# Patient Record
Sex: FEMALE | Race: WHITE | NOT HISPANIC OR LATINO | Employment: FULL TIME | URBAN - METROPOLITAN AREA
[De-identification: names, ages, dates, MRNs, and addresses within clinical notes are randomized per-mention and may not be internally consistent; named-entity substitution may affect disease eponyms.]

---

## 2017-01-03 ENCOUNTER — HOSPITAL ENCOUNTER (EMERGENCY)
Facility: HOSPITAL | Age: 47
Discharge: HOME | End: 2017-01-04
Attending: EMERGENCY MEDICINE | Admitting: EMERGENCY MEDICINE

## 2017-01-03 DIAGNOSIS — I16.0 HYPERTENSIVE URGENCY: Primary | ICD-10-CM

## 2017-01-03 DIAGNOSIS — F41.9 ANXIETY: ICD-10-CM

## 2017-01-03 LAB
ALBUMIN SERPL-MCNC: 3.8 G/DL (ref 3.2–4.8)
ALBUMIN/GLOB SERPL: 1.6 G/DL (ref 1.5–2.5)
ALP SERPL-CCNC: 50 U/L (ref 25–100)
ALT SERPL W P-5'-P-CCNC: 49 U/L (ref 7–40)
ANION GAP SERPL CALCULATED.3IONS-SCNC: 7 MMOL/L (ref 3–11)
AST SERPL-CCNC: 43 U/L (ref 0–33)
BASOPHILS # BLD AUTO: 0.04 10*3/MM3 (ref 0–0.2)
BASOPHILS NFR BLD AUTO: 0.4 % (ref 0–1)
BILIRUB SERPL-MCNC: 0.3 MG/DL (ref 0.3–1.2)
BUN BLD-MCNC: 9 MG/DL (ref 9–23)
BUN/CREAT SERPL: 15 (ref 7–25)
CALCIUM SPEC-SCNC: 8.4 MG/DL (ref 8.7–10.4)
CHLORIDE SERPL-SCNC: 108 MMOL/L (ref 99–109)
CO2 SERPL-SCNC: 23 MMOL/L (ref 20–31)
CREAT BLD-MCNC: 0.6 MG/DL (ref 0.6–1.3)
DEPRECATED RDW RBC AUTO: 42.7 FL (ref 37–54)
EOSINOPHIL # BLD AUTO: 0.43 10*3/MM3 (ref 0.1–0.3)
EOSINOPHIL NFR BLD AUTO: 4 % (ref 0–3)
ERYTHROCYTE [DISTWIDTH] IN BLOOD BY AUTOMATED COUNT: 13.1 % (ref 11.3–14.5)
GFR SERPL CREATININE-BSD FRML MDRD: 107 ML/MIN/1.73
GLOBULIN UR ELPH-MCNC: 2.4 GM/DL
GLUCOSE BLD-MCNC: 88 MG/DL (ref 70–100)
HCT VFR BLD AUTO: 41.8 % (ref 34.5–44)
HGB BLD-MCNC: 14.3 G/DL (ref 11.5–15.5)
IMM GRANULOCYTES # BLD: 0.02 10*3/MM3 (ref 0–0.03)
IMM GRANULOCYTES NFR BLD: 0.2 % (ref 0–0.6)
LYMPHOCYTES # BLD AUTO: 2.28 10*3/MM3 (ref 0.6–4.8)
LYMPHOCYTES NFR BLD AUTO: 21.1 % (ref 24–44)
MCH RBC QN AUTO: 30.4 PG (ref 27–31)
MCHC RBC AUTO-ENTMCNC: 34.2 G/DL (ref 32–36)
MCV RBC AUTO: 88.7 FL (ref 80–99)
MONOCYTES # BLD AUTO: 0.89 10*3/MM3 (ref 0–1)
MONOCYTES NFR BLD AUTO: 8.2 % (ref 0–12)
NEUTROPHILS # BLD AUTO: 7.17 10*3/MM3 (ref 1.5–8.3)
NEUTROPHILS NFR BLD AUTO: 66.1 % (ref 41–71)
PLATELET # BLD AUTO: 221 10*3/MM3 (ref 150–450)
PMV BLD AUTO: 11.5 FL (ref 6–12)
POTASSIUM BLD-SCNC: 3.6 MMOL/L (ref 3.5–5.5)
PROT SERPL-MCNC: 6.2 G/DL (ref 5.7–8.2)
RBC # BLD AUTO: 4.71 10*6/MM3 (ref 3.89–5.14)
SODIUM BLD-SCNC: 138 MMOL/L (ref 132–146)
TROPONIN I SERPL-MCNC: 0.02 NG/ML (ref 0–0.07)
WBC NRBC COR # BLD: 10.83 10*3/MM3 (ref 3.5–10.8)

## 2017-01-03 PROCEDURE — 84484 ASSAY OF TROPONIN QUANT: CPT

## 2017-01-03 PROCEDURE — 96374 THER/PROPH/DIAG INJ IV PUSH: CPT

## 2017-01-03 PROCEDURE — 25010000002 LORAZEPAM PER 2 MG: Performed by: EMERGENCY MEDICINE

## 2017-01-03 PROCEDURE — 85025 COMPLETE CBC W/AUTO DIFF WBC: CPT | Performed by: EMERGENCY MEDICINE

## 2017-01-03 PROCEDURE — 80053 COMPREHEN METABOLIC PANEL: CPT | Performed by: EMERGENCY MEDICINE

## 2017-01-03 PROCEDURE — 93005 ELECTROCARDIOGRAM TRACING: CPT | Performed by: EMERGENCY MEDICINE

## 2017-01-03 PROCEDURE — 93005 ELECTROCARDIOGRAM TRACING: CPT

## 2017-01-03 PROCEDURE — 99284 EMERGENCY DEPT VISIT MOD MDM: CPT

## 2017-01-03 RX ORDER — LORAZEPAM 2 MG/ML
0.5 INJECTION INTRAMUSCULAR
Status: DISCONTINUED | OUTPATIENT
Start: 2017-01-03 | End: 2017-01-04 | Stop reason: HOSPADM

## 2017-01-03 RX ORDER — GUAIFENESIN 600 MG/1
600 TABLET, EXTENDED RELEASE ORAL 2 TIMES DAILY
COMMUNITY
End: 2020-09-28

## 2017-01-03 RX ORDER — SODIUM CHLORIDE 0.9 % (FLUSH) 0.9 %
10 SYRINGE (ML) INJECTION AS NEEDED
Status: DISCONTINUED | OUTPATIENT
Start: 2017-01-03 | End: 2017-01-04 | Stop reason: HOSPADM

## 2017-01-03 RX ORDER — LORAZEPAM 2 MG/ML
1 INJECTION INTRAMUSCULAR ONCE
Status: DISCONTINUED | OUTPATIENT
Start: 2017-01-03 | End: 2017-01-03

## 2017-01-03 RX ADMIN — LORAZEPAM 0.5 MG: 2 INJECTION INTRAMUSCULAR; INTRAVENOUS at 23:17

## 2017-01-04 VITALS
TEMPERATURE: 98.2 F | OXYGEN SATURATION: 93 % | WEIGHT: 210 LBS | BODY MASS INDEX: 30.06 KG/M2 | DIASTOLIC BLOOD PRESSURE: 101 MMHG | HEART RATE: 75 BPM | HEIGHT: 70 IN | RESPIRATION RATE: 16 BRPM | SYSTOLIC BLOOD PRESSURE: 172 MMHG

## 2017-01-04 LAB
HOLD SPECIMEN: NORMAL
HOLD SPECIMEN: NORMAL
WHOLE BLOOD HOLD SPECIMEN: NORMAL
WHOLE BLOOD HOLD SPECIMEN: NORMAL

## 2017-01-04 PROCEDURE — 96376 TX/PRO/DX INJ SAME DRUG ADON: CPT

## 2017-01-04 PROCEDURE — 25010000002 LORAZEPAM PER 2 MG: Performed by: EMERGENCY MEDICINE

## 2017-01-04 PROCEDURE — 96375 TX/PRO/DX INJ NEW DRUG ADDON: CPT

## 2017-01-04 RX ORDER — HYDROCHLOROTHIAZIDE 25 MG/1
25 TABLET ORAL DAILY
Qty: 1 TABLET | Refills: 0 | Status: SHIPPED | OUTPATIENT
Start: 2017-01-04 | End: 2020-09-28

## 2017-01-04 RX ORDER — ALPRAZOLAM 0.25 MG/1
0.25 TABLET ORAL 3 TIMES DAILY PRN
Qty: 10 TABLET | Refills: 0 | Status: SHIPPED | OUTPATIENT
Start: 2017-01-04 | End: 2020-09-28

## 2017-01-04 RX ORDER — LABETALOL HYDROCHLORIDE 5 MG/ML
10 INJECTION, SOLUTION INTRAVENOUS ONCE
Status: COMPLETED | OUTPATIENT
Start: 2017-01-04 | End: 2017-01-04

## 2017-01-04 RX ORDER — LABETALOL HYDROCHLORIDE 5 MG/ML
10 INJECTION, SOLUTION INTRAVENOUS ONCE
Status: DISCONTINUED | OUTPATIENT
Start: 2017-01-04 | End: 2017-01-04

## 2017-01-04 RX ADMIN — LABETALOL HYDROCHLORIDE 10 MG: 5 INJECTION, SOLUTION INTRAVENOUS at 00:56

## 2017-01-04 RX ADMIN — LORAZEPAM 0.5 MG: 2 INJECTION INTRAMUSCULAR; INTRAVENOUS at 00:16

## 2017-01-04 RX ADMIN — LABETALOL HYDROCHLORIDE 10 MG: 5 INJECTION, SOLUTION INTRAVENOUS at 03:08

## 2017-01-04 NOTE — ED PROVIDER NOTES
Subjective   HPI Comments: 46 y.o. female presents to the ED w/ c/o elevated blood pressure today. Pt has had a sinus infection over the last couple days with nasal congestion and sinus pressure. No fever. She was seen at the [facility] this evening and was told her blood pressure was significantly elevated and advised to present to the ED for further evaluation. Pt does not have a history of HTN and states her blood pressure from her most recent office visit was 120/84. It is 248/125 upon presentation to the ED.    Pt notes that she has been under significant stress this weak 2/2 family medical issues and has not gotten a lot of sleep. She has a FHx significant for HTN, CAD, and CVA.    Patient is a 46 y.o. female presenting with hypertension.   History provided by:  Patient  Hypertension   Severity:  Severe  Onset quality:  Unable to specify  Duration:  1 day  Timing:  Constant  Progression:  Unchanged  Chronicity:  New  Time since last dose of antihypertensive: never.  Notable PTA blood pressures:  248/125  Context: stress    Ineffective treatments:  None tried  Associated symptoms: no blurred vision, no chest pain, no fever, no loss of consciousness, no nausea, no shortness of breath, not vomiting and no weakness        Review of Systems   Constitutional: Negative for fever.   HENT: Positive for congestion and sinus pressure.    Eyes: Negative for blurred vision.   Respiratory: Negative for shortness of breath.    Cardiovascular: Negative for chest pain.   Gastrointestinal: Negative for nausea and vomiting.   Neurological: Negative for loss of consciousness and weakness.   All other systems reviewed and are negative.      History reviewed. No pertinent past medical history.    Allergies   Allergen Reactions   • Amoxicillin    • Codeine    • Erythromycin        Past Surgical History   Procedure Laterality Date   • Mouth surgery         History reviewed. No pertinent family history.    Social History     Social  History   • Marital status: Single     Spouse name: N/A   • Number of children: N/A   • Years of education: N/A     Social History Main Topics   • Smoking status: Light Tobacco Smoker     Types: Cigarettes   • Smokeless tobacco: None   • Alcohol use Yes      Comment: VERY RARE OCCASION   • Drug use: No   • Sexual activity: Defer     Other Topics Concern   • None     Social History Narrative   • None         Objective   Physical Exam   Constitutional: She is oriented to person, place, and time. She appears well-developed and well-nourished. No distress.   HENT:   Head: Normocephalic and atraumatic.   Eyes: Conjunctivae are normal. Pupils are equal, round, and reactive to light.   Neck: Normal range of motion. Neck supple.   Cardiovascular: Normal rate, regular rhythm and normal heart sounds.    Pulmonary/Chest: Effort normal and breath sounds normal. No respiratory distress. She has no wheezes. She has no rales. She exhibits no tenderness.   Abdominal: Soft. There is no tenderness.   Musculoskeletal: Normal range of motion. She exhibits no edema.   Neurological: She is alert and oriented to person, place, and time.   Skin: Skin is warm and dry. She is not diaphoretic.   Psychiatric: She has a normal mood and affect. Her behavior is normal.   Nursing note and vitals reviewed.      Procedures         ED Course  ED Course       Course of Care  Results for CHEMA YO (MRN [id number]) as of 1/8/2017 15:51   Ref. Range 1/3/2017 23:13   Glucose Latest Ref Range: 70 - 100 mg/dL 88   Sodium Latest Ref Range: 132 - 146 mmol/L 138   Potassium Latest Ref Range: 3.5 - 5.5 mmol/L 3.6   CO2 Latest Ref Range: 20.0 - 31.0 mmol/L 23.0   Chloride Latest Ref Range: 99 - 109 mmol/L 108   Anion Gap Latest Ref Range: 3.0 - 11.0 mmol/L 7.0   Creatinine Latest Ref Range: 0.60 - 1.30 mg/dL 0.60   BUN Latest Ref Range: 9 - 23 mg/dL 9   BUN/Creatinine Ratio Latest Ref Range: 7.0 - 25.0  15.0   Calcium Latest Ref Range: 8.7 - 10.4  mg/dL 8.4 (L)   eGFR Non African Amer Latest Ref Range: >60 mL/min/1.73 107   Alkaline Phosphatase Latest Ref Range: 25 - 100 U/L 50   Total Protein Latest Ref Range: 5.7 - 8.2 g/dL 6.2   ALT (SGPT) Latest Ref Range: 7 - 40 U/L 49 (H)   AST (SGOT) Latest Ref Range: 0 - 33 U/L 43 (H)   Total Bilirubin Latest Ref Range: 0.3 - 1.2 mg/dL 0.3   Albumin Latest Ref Range: 3.20 - 4.80 g/dL 3.80   Globulin Latest Units: gm/dL 2.4   A/G Ratio Latest Ref Range: 1.5 - 2.5 g/dL 1.6   WBC Latest Ref Range: 3.50 - 10.80 10*3/mm3 10.83 (H)   RBC Latest Ref Range: 3.89 - 5.14 10*6/mm3 4.71   Hemoglobin Latest Ref Range: 11.5 - 15.5 g/dL 14.3   Hematocrit Latest Ref Range: 34.5 - 44.0 % 41.8   RDW Latest Ref Range: 11.3 - 14.5 % 13.1   MCV Latest Ref Range: 80.0 - 99.0 fL 88.7   MCH Latest Ref Range: 27.0 - 31.0 pg 30.4   MCHC Latest Ref Range: 32.0 - 36.0 g/dL 34.2   MPV Latest Ref Range: 6.0 - 12.0 fL 11.5   Platelets Latest Ref Range: 150 - 450 10*3/mm3 221   RDW-SD Latest Ref Range: 37.0 - 54.0 fl 42.7   Neutrophil % Latest Ref Range: 41.0 - 71.0 % 66.1   Lymphocyte % Latest Ref Range: 24.0 - 44.0 % 21.1 (L)   Monocyte % Latest Ref Range: 0.0 - 12.0 % 8.2   Eosinophil % Latest Ref Range: 0.0 - 3.0 % 4.0 (H)   Basophil % Latest Ref Range: 0.0 - 1.0 % 0.4   Immature Grans % Latest Ref Range: 0.0 - 0.6 % 0.2   Neutrophils, Absolute Latest Ref Range: 1.50 - 8.30 10*3/mm3 7.17   Lymphocytes, Absolute Latest Ref Range: 0.60 - 4.80 10*3/mm3 2.28   Monocytes, Absolute Latest Ref Range: 0.00 - 1.00 10*3/mm3 0.89   Eosinophils, Absolute Latest Ref Range: 0.10 - 0.30 10*3/mm3 0.43 (H)   Basophils, Absolute Latest Ref Range: 0.00 - 0.20 10*3/mm3 0.04   Immature Grans, Absolute Latest Ref Range: 0.00 - 0.03 10*3/mm3 0.02       Lab Results (last 24 hours)     ** No results found for the last 24 hours. **          Note: In addition to lab results from this visit, the labs listed above may include labs taken at another facility or during a  different encounter within the last 24 hours. Please correlate lab times with ED admission and discharge times for further clarification of the services performed during this visit.    No orders to display       Vitals:    01/04/17 0300 01/04/17 0320 01/04/17 0340 01/04/17 0342   BP: (!) 174/104 (!) 177/104 (!) 172/101    BP Location:       Patient Position:       Pulse: 75 77  75   Resp:       Temp:       TempSrc:       SpO2: 92% 96% 91% 93%   Weight:       Height:           Medications   labetalol (NORMODYNE,TRANDATE) injection 10 mg (10 mg Intravenous Given 1/4/17 0056)   labetalol (NORMODYNE,TRANDATE) injection 10 mg (10 mg Intravenous Given 1/4/17 0308)       ECG/EMG Results (last 24 hours)     Procedure Component Value Units Date/Time    ECG 12 Lead [[id number]] Collected:  01/03/17 2206     Updated:  01/03/17 2209          Pt understands the risks of HTN.  She denies symptoms at this time and attributes her current exacerbation to a very stressful week (family member illness).  She will check her BP several times/day at home and return to the ED if concerns arise.  Otherwise she will f/u with PCP for BP management.  Family is also comfortable with the plan.              MDM    Final diagnoses:   Hypertensive urgency   Anxiety       Documentation assistance provided by manuel Escalona.  Information recorded by the manuel was done at my direction and has been verified and validated by me.     Danni Escalona  01/03/17 4146       Tanvir Dumont DO  01/08/17 5749

## 2017-01-04 NOTE — DISCHARGE INSTRUCTIONS
Follow up with one of the [facility] physicians below to setup primary care.    (Dr. Otero, Dr. Araiza, Dr. Canales, and Dr. Byrne.)  [facility], Primary Care, [phone], [address], [place] [address]    [facility], Primary Care, [phone], [address], [address] [place], [address] [place]     ([place]) [facility], Primary Care, [phone], [address] [place], [address] [place]     ([facility]) [facility], Primary Care, [phone], [address] [place], [address]     [place] [address] [facility], Primary Care, [phone], [address], [address] [place], [address]    [place] [address] [facility], Primary Care, [phone], [address]    [place], [address] [facility], Primary Care, [phone], [address], [address] [place], [address] [place]    ([facility], Primary Care, [phone], [address] [place], [address] [facility]) [facility], Primary Care, [phone], [address], [address] [place], [address] [facility], Primary Care, [phone], [address], [address] [place], [address] [address] [facility], Primary Care, [phone], [address], [place], [address]     [facility], Primary Care, [phone], [address] [place], [address]

## 2020-09-26 ENCOUNTER — HOSPITAL ENCOUNTER (EMERGENCY)
Facility: HOSPITAL | Age: 50
Discharge: HOME | End: 2020-09-26
Attending: EMERGENCY MEDICINE | Admitting: EMERGENCY MEDICINE

## 2020-09-26 ENCOUNTER — APPOINTMENT (OUTPATIENT)
Dept: CT IMAGING | Facility: HOSPITAL | Age: 50
End: 2020-09-26

## 2020-09-26 VITALS
WEIGHT: 195 LBS | TEMPERATURE: 98 F | HEIGHT: 70 IN | OXYGEN SATURATION: 96 % | RESPIRATION RATE: 12 BRPM | DIASTOLIC BLOOD PRESSURE: 94 MMHG | SYSTOLIC BLOOD PRESSURE: 148 MMHG | BODY MASS INDEX: 27.92 KG/M2 | HEART RATE: 63 BPM

## 2020-09-26 DIAGNOSIS — D72.829 LEUKOCYTOSIS, UNSPECIFIED TYPE: ICD-10-CM

## 2020-09-26 DIAGNOSIS — R19.00 PELVIC MASS: Primary | ICD-10-CM

## 2020-09-26 LAB
ALBUMIN SERPL-MCNC: 5.1 G/DL (ref 3.5–5.2)
ALBUMIN/GLOB SERPL: 1.6 G/DL
ALP SERPL-CCNC: 101 U/L (ref 39–117)
ALT SERPL W P-5'-P-CCNC: 31 U/L (ref 1–33)
ANION GAP SERPL CALCULATED.3IONS-SCNC: 13 MMOL/L (ref 5–15)
AST SERPL-CCNC: 26 U/L (ref 1–32)
BACTERIA UR QL AUTO: ABNORMAL /HPF
BASOPHILS # BLD AUTO: 0.07 10*3/MM3 (ref 0–0.2)
BASOPHILS NFR BLD AUTO: 0.5 % (ref 0–1.5)
BILIRUB SERPL-MCNC: 0.5 MG/DL (ref 0–1.2)
BILIRUB UR QL STRIP: NEGATIVE
BUN SERPL-MCNC: 22 MG/DL (ref 6–20)
BUN/CREAT SERPL: 25.6 (ref 7–25)
CALCIUM SPEC-SCNC: 10 MG/DL (ref 8.6–10.5)
CHLORIDE SERPL-SCNC: 99 MMOL/L (ref 98–107)
CLARITY UR: ABNORMAL
CO2 SERPL-SCNC: 27 MMOL/L (ref 22–29)
COLOR UR: YELLOW
CREAT SERPL-MCNC: 0.86 MG/DL (ref 0.57–1)
DEPRECATED RDW RBC AUTO: 42.1 FL (ref 37–54)
EOSINOPHIL # BLD AUTO: 0.42 10*3/MM3 (ref 0–0.4)
EOSINOPHIL NFR BLD AUTO: 2.8 % (ref 0.3–6.2)
ERYTHROCYTE [DISTWIDTH] IN BLOOD BY AUTOMATED COUNT: 12.4 % (ref 12.3–15.4)
GFR SERPL CREATININE-BSD FRML MDRD: 70 ML/MIN/1.73
GLOBULIN UR ELPH-MCNC: 3.2 GM/DL
GLUCOSE SERPL-MCNC: 115 MG/DL (ref 65–99)
GLUCOSE UR STRIP-MCNC: NEGATIVE MG/DL
HCT VFR BLD AUTO: 48.2 % (ref 34–46.6)
HGB BLD-MCNC: 15.5 G/DL (ref 12–15.9)
HGB UR QL STRIP.AUTO: ABNORMAL
HOLD SPECIMEN: NORMAL
HOLD SPECIMEN: NORMAL
HYALINE CASTS UR QL AUTO: ABNORMAL /LPF
IMM GRANULOCYTES # BLD AUTO: 0.07 10*3/MM3 (ref 0–0.05)
IMM GRANULOCYTES NFR BLD AUTO: 0.5 % (ref 0–0.5)
KETONES UR QL STRIP: NEGATIVE
LEUKOCYTE ESTERASE UR QL STRIP.AUTO: NEGATIVE
LIPASE SERPL-CCNC: 25 U/L (ref 13–60)
LYMPHOCYTES # BLD AUTO: 2.05 10*3/MM3 (ref 0.7–3.1)
LYMPHOCYTES NFR BLD AUTO: 13.7 % (ref 19.6–45.3)
MCH RBC QN AUTO: 29.7 PG (ref 26.6–33)
MCHC RBC AUTO-ENTMCNC: 32.2 G/DL (ref 31.5–35.7)
MCV RBC AUTO: 92.3 FL (ref 79–97)
MONOCYTES # BLD AUTO: 0.99 10*3/MM3 (ref 0.1–0.9)
MONOCYTES NFR BLD AUTO: 6.6 % (ref 5–12)
NEUTROPHILS NFR BLD AUTO: 11.39 10*3/MM3 (ref 1.7–7)
NEUTROPHILS NFR BLD AUTO: 75.9 % (ref 42.7–76)
NITRITE UR QL STRIP: NEGATIVE
NRBC BLD AUTO-RTO: 0 /100 WBC (ref 0–0.2)
PH UR STRIP.AUTO: 6 [PH] (ref 5–8)
PLATELET # BLD AUTO: 269 10*3/MM3 (ref 140–450)
PMV BLD AUTO: 11.3 FL (ref 6–12)
POTASSIUM SERPL-SCNC: 3.8 MMOL/L (ref 3.5–5.2)
PROT SERPL-MCNC: 8.3 G/DL (ref 6–8.5)
PROT UR QL STRIP: ABNORMAL
RBC # BLD AUTO: 5.22 10*6/MM3 (ref 3.77–5.28)
RBC # UR: ABNORMAL /HPF
REF LAB TEST METHOD: ABNORMAL
SODIUM SERPL-SCNC: 139 MMOL/L (ref 136–145)
SP GR UR STRIP: 1.03 (ref 1–1.03)
SQUAMOUS #/AREA URNS HPF: ABNORMAL /HPF
UROBILINOGEN UR QL STRIP: ABNORMAL
WBC # BLD AUTO: 14.99 10*3/MM3 (ref 3.4–10.8)
WBC UR QL AUTO: ABNORMAL /HPF
WHOLE BLOOD HOLD SPECIMEN: NORMAL
WHOLE BLOOD HOLD SPECIMEN: NORMAL

## 2020-09-26 PROCEDURE — 96375 TX/PRO/DX INJ NEW DRUG ADDON: CPT

## 2020-09-26 PROCEDURE — 85025 COMPLETE CBC W/AUTO DIFF WBC: CPT | Performed by: EMERGENCY MEDICINE

## 2020-09-26 PROCEDURE — 83690 ASSAY OF LIPASE: CPT | Performed by: EMERGENCY MEDICINE

## 2020-09-26 PROCEDURE — 80053 COMPREHEN METABOLIC PANEL: CPT | Performed by: EMERGENCY MEDICINE

## 2020-09-26 PROCEDURE — 99284 EMERGENCY DEPT VISIT MOD MDM: CPT

## 2020-09-26 PROCEDURE — 72194 CT PELVIS W/O & W/DYE: CPT

## 2020-09-26 PROCEDURE — 74176 CT ABD & PELVIS W/O CONTRAST: CPT

## 2020-09-26 PROCEDURE — 25010000002 IOPAMIDOL 61 % SOLUTION: Performed by: EMERGENCY MEDICINE

## 2020-09-26 PROCEDURE — 51702 INSERT TEMP BLADDER CATH: CPT

## 2020-09-26 PROCEDURE — 81001 URINALYSIS AUTO W/SCOPE: CPT | Performed by: EMERGENCY MEDICINE

## 2020-09-26 PROCEDURE — 96374 THER/PROPH/DIAG INJ IV PUSH: CPT

## 2020-09-26 PROCEDURE — 25010000002 KETOROLAC TROMETHAMINE PER 15 MG: Performed by: EMERGENCY MEDICINE

## 2020-09-26 PROCEDURE — 25010000002 ONDANSETRON PER 1 MG: Performed by: EMERGENCY MEDICINE

## 2020-09-26 RX ORDER — KETOROLAC TROMETHAMINE 15 MG/ML
15 INJECTION, SOLUTION INTRAMUSCULAR; INTRAVENOUS ONCE
Status: COMPLETED | OUTPATIENT
Start: 2020-09-26 | End: 2020-09-26

## 2020-09-26 RX ORDER — SODIUM CHLORIDE 0.9 % (FLUSH) 0.9 %
10 SYRINGE (ML) INJECTION AS NEEDED
Status: DISCONTINUED | OUTPATIENT
Start: 2020-09-26 | End: 2020-09-26 | Stop reason: HOSPADM

## 2020-09-26 RX ORDER — ONDANSETRON 2 MG/ML
4 INJECTION INTRAMUSCULAR; INTRAVENOUS ONCE
Status: COMPLETED | OUTPATIENT
Start: 2020-09-26 | End: 2020-09-26

## 2020-09-26 RX ORDER — LOSARTAN POTASSIUM AND HYDROCHLOROTHIAZIDE 12.5; 1 MG/1; MG/1
1 TABLET ORAL DAILY
COMMUNITY

## 2020-09-26 RX ADMIN — ONDANSETRON 4 MG: 2 INJECTION INTRAMUSCULAR; INTRAVENOUS at 11:48

## 2020-09-26 RX ADMIN — KETOROLAC TROMETHAMINE 15 MG: 15 INJECTION, SOLUTION INTRAMUSCULAR; INTRAVENOUS at 11:50

## 2020-09-26 RX ADMIN — IOPAMIDOL 35 ML: 612 INJECTION, SOLUTION INTRAVENOUS at 17:45

## 2020-09-26 RX ADMIN — SODIUM CHLORIDE 1000 ML: 9 INJECTION, SOLUTION INTRAVENOUS at 11:49

## 2020-09-26 NOTE — ED PROVIDER NOTES
Subjective   50-year-old female presents for evaluation of right flank pain.  Of note, the patient has no prior history of kidney stones.  She states that this morning at approximately 7:30 AM she began experiencing significant pain in her right flank that radiated to her right groin.  The pain has been coming in waves since that time.  At its worst, the pain was 9 out of 10 in severity.  She endorses accompanying nausea as well.  She denies any urinary symptoms.  No known triggers for her symptoms.          Review of Systems   Gastrointestinal: Positive for nausea.   Genitourinary: Positive for flank pain.   All other systems reviewed and are negative.      Past Medical History:   Diagnosis Date   • Hypertension        Allergies   Allergen Reactions   • Amoxicillin Other (See Comments)     ABD PAIN   • Codeine Other (See Comments)     CODEINE     • Erythromycin Other (See Comments)     ABD PAIN/SICK FEELING       Past Surgical History:   Procedure Laterality Date   • MOUTH SURGERY         History reviewed. No pertinent family history.    Social History     Socioeconomic History   • Marital status: Single     Spouse name: Not on file   • Number of children: Not on file   • Years of education: Not on file   • Highest education level: Not on file   Tobacco Use   • Smoking status: Light Tobacco Smoker     Packs/day: 0.30     Types: Cigarettes   Substance and Sexual Activity   • Alcohol use: Yes     Alcohol/week: 3.0 standard drinks     Types: 3 Glasses of wine per week   • Drug use: No   • Sexual activity: Defer           Objective   Physical Exam  Vitals signs and nursing note reviewed.   Constitutional:       General: She is not in acute distress.     Appearance: Normal appearance. She is well-developed. She is not diaphoretic.      Comments: Nontoxic-appearing female   HENT:      Head: Normocephalic and atraumatic.   Neck:      Musculoskeletal: Normal range of motion and neck supple.   Cardiovascular:      Rate and  Rhythm: Normal rate.      Heart sounds: Normal heart sounds. No murmur. No friction rub. No gallop.    Pulmonary:      Effort: Pulmonary effort is normal. No respiratory distress.      Breath sounds: Normal breath sounds. No wheezing or rales.   Abdominal:      General: Bowel sounds are normal. There is no distension.      Palpations: Abdomen is soft. There is no mass.      Tenderness: There is no abdominal tenderness. There is no guarding or rebound.      Comments: No focal abdominal tenderness, no peritoneal signs, no pain out of proportion to exam   Genitourinary:     Comments: No CVA tenderness noted  Musculoskeletal: Normal range of motion.   Skin:     General: Skin is warm and dry.      Findings: No erythema or rash.      Comments: No dermatomal rash noted to right flank   Neurological:      Mental Status: She is alert and oriented to person, place, and time.   Psychiatric:         Thought Content: Thought content normal.         Judgment: Judgment normal.         Procedures           ED Course  ED Course as of Sep 26 1902   Sat Sep 26, 2020   1200 50-year-old female presents for evaluation of right flank pain.  She has no prior history of kidney stones.  She began experiencing pain in her right flank accompanied by nausea this morning at 7:30 AM.  Her symptoms have come in waves.  On arrival to the ED, the patient is nontoxic-appearing.  Exam and clinical presentation appears consistent with likely kidney stone.  We will obtain labs and imaging and will reassess following initial interventions.    [DD]   1256 CT revealed markedly distended urinary bladder.  We will place a catheter and will reassess afterward.    [DD]   3602 I was called to the patient's bedside by her nurse as the first time that an in and out catheter was placed, there was only about 200 cc of urine output.  However, the patient's bladder on her bedside bladder scan was still markedly distended with more than 1 L and the patient was still  "quite symptomatic.  I personally went into the patient's room and we once again reattempted to place an in and out catheter.  Approximately 50 cc of urine output was noted and the patient still has marketed urinary retention.    [DD]   1438 Dr. Dean recommended placing an indwelling 18 Cambodian [place] catheter.    [DD]   1548 An 18 Cambodian catheter was placed without difficulty and once again minimal urine output was obtained.  I am quite perplexed at this point as I have watched the nurse place a catheter 3 times now and it is clearly within the urethra; however, the patient's bladder is not draining adequately.    [DD]   1607 I once again discussed the patient's case with Dr. Dean.      [DD]   1609 He recommended obtaining a CT cystogram as he felt that the \"bladder\" that we were seeing on CT scan was most likely not the patient's urinary bladder but was more likely a large pelvic mass.    [DD]   1743 CT cystogram confirmed that the pathology we were seeing on CT imaging was indeed a pelvic mass rather than the patient's urinary bladder.    [DD]   1754 FRANKY query complete. Treatment plan to include limited course of prescribed  controlled substance. Risks including addiction, benefits, and alternatives presented to patient.   Request number: [id number]     [BS]   8976 I discussed the patient's case with Dr. Foster.  She recommended giving the patient the option of inpatient versus outpatient management.  I spoke with the patient who would prefer to go home and follow-up in the office on Monday.  I feel that this is a perfectly reasonable request.  Her pain is adequately controlled at this time.  She is voiding without difficulty.  She will follow-up on Monday morning at 830.  Agreeable with plan and given appropriate strict return precautions.    [DD]      ED Course User Index  [BS] Quintin Celis  [DD] Martínez Winchester MD                                   Recent Results (from the past 24 hour(s)) "   Comprehensive Metabolic Panel    Collection Time: 09/26/20 11:29 AM    Specimen: Blood   Result Value Ref Range    Glucose 115 (H) 65 - 99 mg/dL    BUN 22 (H) 6 - 20 mg/dL    Creatinine 0.86 0.57 - 1.00 mg/dL    Sodium 139 136 - 145 mmol/L    Potassium 3.8 3.5 - 5.2 mmol/L    Chloride 99 98 - 107 mmol/L    CO2 27.0 22.0 - 29.0 mmol/L    Calcium 10.0 8.6 - 10.5 mg/dL    Total Protein 8.3 6.0 - 8.5 g/dL    Albumin 5.10 3.50 - 5.20 g/dL    ALT (SGPT) 31 1 - 33 U/L    AST (SGOT) 26 1 - 32 U/L    Alkaline Phosphatase 101 39 - 117 U/L    Total Bilirubin 0.5 0.0 - 1.2 mg/dL    eGFR Non African Amer 70 >60 mL/min/1.73    Globulin 3.2 gm/dL    A/G Ratio 1.6 g/dL    BUN/Creatinine Ratio 25.6 (H) 7.0 - 25.0    Anion Gap 13.0 5.0 - 15.0 mmol/L   Lipase    Collection Time: 09/26/20 11:29 AM    Specimen: Blood   Result Value Ref Range    Lipase 25 13 - 60 U/L   Urinalysis With Microscopic If Indicated (No Culture) - Urine, Clean Catch    Collection Time: 09/26/20 11:29 AM    Specimen: Urine, Clean Catch   Result Value Ref Range    Color, UA Yellow Yellow, Straw    Appearance, UA Cloudy (A) Clear    pH, UA 6.0 5.0 - 8.0    Specific Gravity, UA 1.029 1.001 - 1.030    Glucose, UA Negative Negative    Ketones, UA Negative Negative    Bilirubin, UA Negative Negative    Blood, UA Moderate (2+) (A) Negative    Protein, UA 30 mg/dL (1+) (A) Negative    Leuk Esterase, UA Negative Negative    Nitrite, UA Negative Negative    Urobilinogen, UA 1.0 E.U./dL 0.2 - 1.0 E.U./dL   Light Blue Top    Collection Time: 09/26/20 11:29 AM   Result Value Ref Range    Extra Tube hold for add-on    Green Top (Gel)    Collection Time: 09/26/20 11:29 AM   Result Value Ref Range    Extra Tube Hold for add-ons.    Lavender Top    Collection Time: 09/26/20 11:29 AM   Result Value Ref Range    Extra Tube hold for add-on    Gold Top - SST    Collection Time: 09/26/20 11:29 AM   Result Value Ref Range    Extra Tube Hold for add-ons.    CBC Auto Differential     Collection Time: 09/26/20 11:29 AM    Specimen: Blood   Result Value Ref Range    WBC 14.99 (H) 3.40 - 10.80 10*3/mm3    RBC 5.22 3.77 - 5.28 10*6/mm3    Hemoglobin 15.5 12.0 - 15.9 g/dL    Hematocrit 48.2 (H) 34.0 - 46.6 %    MCV 92.3 79.0 - 97.0 fL    MCH 29.7 26.6 - 33.0 pg    MCHC 32.2 31.5 - 35.7 g/dL    RDW 12.4 12.3 - 15.4 %    RDW-SD 42.1 37.0 - 54.0 fl    MPV 11.3 6.0 - 12.0 fL    Platelets 269 140 - 450 10*3/mm3    Neutrophil % 75.9 42.7 - 76.0 %    Lymphocyte % 13.7 (L) 19.6 - 45.3 %    Monocyte % 6.6 5.0 - 12.0 %    Eosinophil % 2.8 0.3 - 6.2 %    Basophil % 0.5 0.0 - 1.5 %    Immature Grans % 0.5 0.0 - 0.5 %    Neutrophils, Absolute 11.39 (H) 1.70 - 7.00 10*3/mm3    Lymphocytes, Absolute 2.05 0.70 - 3.10 10*3/mm3    Monocytes, Absolute 0.99 (H) 0.10 - 0.90 10*3/mm3    Eosinophils, Absolute 0.42 (H) 0.00 - 0.40 10*3/mm3    Basophils, Absolute 0.07 0.00 - 0.20 10*3/mm3    Immature Grans, Absolute 0.07 (H) 0.00 - 0.05 10*3/mm3    nRBC 0.0 0.0 - 0.2 /100 WBC   Urinalysis, Microscopic Only - Urine, Clean Catch    Collection Time: 09/26/20 11:29 AM    Specimen: Urine, Clean Catch   Result Value Ref Range    RBC, UA 21-30 (A) None Seen, 0-2 /HPF    WBC, UA 0-2 None Seen, 0-2 /HPF    Bacteria, UA 2+ (A) None Seen, Trace /HPF    Squamous Epithelial Cells, UA 0-2 None Seen, 0-2 /HPF    Hyaline Casts, UA 0-6 0 - 6 /LPF    Methodology Automated Microscopy      Note: In addition to lab results from this visit, the labs listed above may include labs taken at another facility or during a different encounter within the last 24 hours. Please correlate lab times with ED admission and discharge times for further clarification of the services performed during this visit.    CT Pelvis With & Without Contrast   Final Result   Normal-appearing smooth-walled urinary bladder without   suspicious wall thickening. Near complete emptying on postvoid views. 16   cm right adnexal simple appearing cystic lesion without definite solid    nodular component or septations, also present on same-day comparison.   While likely benign in etiology, given size, recommend gynecologic   consultation.           This report was finalized on 9/26/2020 5:55 PM by Frank Davis.          CT Abdomen Pelvis Without Contrast   Final Result   Addendum 1 of 1   Addendum: Large fluid-filled finding occupying much of the pelvis and   lower abdomen represents right adnexal cyst, not distended urinary   bladder. The urinary bladder is partially decompressed and unremarkable.   Please see subsequently performed and separately reported CT cystogram   for further details.       This report was finalized on 9/26/2020 5:56 PM by Frank Davis.          Final        Vitals:    09/26/20 1803 09/26/20 1804 09/26/20 1810 09/26/20 1820   BP: 148/94      BP Location:       Patient Position:       Pulse:   63    Resp:   12    Temp:    98 °F (36.7 °C)   TempSrc:       SpO2:  96%     Weight:       Height:         Medications   sodium chloride 0.9 % flush 10 mL (has no administration in time range)   sodium chloride 0.9 % bolus 1,000 mL (0 mL Intravenous Stopped 9/26/20 1349)   ketorolac (TORADOL) injection 15 mg (15 mg Intravenous Given 9/26/20 1150)   ondansetron (ZOFRAN) injection 4 mg (4 mg Intravenous Given 9/26/20 1148)   iopamidol (ISOVUE-300) 61 % injection 100 mL (35 mL Intravenous Given 9/26/20 1745)     ECG/EMG Results (last 24 hours)     ** No results found for the last 24 hours. **        No orders to display               MDM    Final diagnoses:   Pelvic mass   Leukocytosis, unspecified type            Martínez Winchester MD  09/26/20 2981

## 2020-09-28 ENCOUNTER — OFFICE VISIT (OUTPATIENT)
Dept: GYNECOLOGIC ONCOLOGY | Facility: CLINIC | Age: 50
End: 2020-09-28

## 2020-09-28 VITALS
BODY MASS INDEX: 28.03 KG/M2 | SYSTOLIC BLOOD PRESSURE: 184 MMHG | TEMPERATURE: 98 F | HEIGHT: 70 IN | HEART RATE: 78 BPM | RESPIRATION RATE: 17 BRPM | DIASTOLIC BLOOD PRESSURE: 86 MMHG | WEIGHT: 195.8 LBS

## 2020-09-28 DIAGNOSIS — R19.00 PELVIC MASS: Primary | ICD-10-CM

## 2020-09-28 PROCEDURE — 99406 BEHAV CHNG SMOKING 3-10 MIN: CPT | Performed by: OBSTETRICS & GYNECOLOGY

## 2020-09-28 PROCEDURE — 99205 OFFICE O/P NEW HI 60 MIN: CPT | Performed by: OBSTETRICS & GYNECOLOGY

## 2020-09-28 RX ORDER — HEPARIN SODIUM 5000 [USP'U]/ML
5000 INJECTION, SOLUTION INTRAVENOUS; SUBCUTANEOUS ONCE
Status: DISCONTINUED | OUTPATIENT
Start: 2020-09-28 | End: 2020-09-28 | Stop reason: HOSPADM

## 2020-09-28 RX ORDER — CELECOXIB 100 MG/1
200 CAPSULE ORAL ONCE
Status: DISCONTINUED | OUTPATIENT
Start: 2020-09-28 | End: 2020-09-28 | Stop reason: HOSPADM

## 2020-09-28 RX ORDER — SODIUM CHLORIDE, SODIUM LACTATE, POTASSIUM CHLORIDE, CALCIUM CHLORIDE 600; 310; 30; 20 MG/100ML; MG/100ML; MG/100ML; MG/100ML
100 INJECTION, SOLUTION INTRAVENOUS CONTINUOUS
Status: CANCELLED | OUTPATIENT
Start: 2020-09-28

## 2020-09-28 RX ORDER — GABAPENTIN 100 MG/1
300 CAPSULE ORAL ONCE
Status: DISCONTINUED | OUTPATIENT
Start: 2020-09-28 | End: 2020-09-28 | Stop reason: HOSPADM

## 2020-09-28 RX ORDER — OXYCODONE HYDROCHLORIDE 5 MG/1
5 TABLET ORAL ONCE
Status: DISCONTINUED | OUTPATIENT
Start: 2020-09-28 | End: 2020-09-28 | Stop reason: HOSPADM

## 2020-09-28 RX ORDER — ACETAMINOPHEN 325 MG/1
1000 TABLET ORAL ONCE
Status: DISCONTINUED | OUTPATIENT
Start: 2020-09-28 | End: 2020-09-28 | Stop reason: HOSPADM

## 2020-09-28 NOTE — PATIENT INSTRUCTIONS
Inpatient Surgery Instructions          Ela Torres  [id number]  1970    SURGEON:  Penny Santoro MD    Surgery Coordinator: [place]  If you have any questions before or after surgery please call.  [phone]         Appointment                      1. You have pre-admission testing (PAT) appointment on 10/05/2020 at 08:30 am.. The registration department is located in the long hallway between the [facility] and [address] [place] [address].     2.  Your surgery has been scheduled on 10/06/2020.  You will need to be at the [address] [place] [address] second floor surgery registration on that day at 06:00 am.    The Day(s) Before Surgery     ·  Nothing by mouth after midnight on 10/05/2020    · Plan to have someone take you home from the hospital.    · Do not use any products that contain nicotine or tobacco, such as cigarettes and e-cigarettes. These can delay healing after surgery. If you need help quitting, ask your health care provider.    ·  Do not take vitamins or aspirin one week before surgery ( if applicable).  On the morning of your surgery, you may take you prescription medications with a sip of water, unless told otherwise by your provider.  Bring them with you to the hospital (Diabetic patient should bring insulin if instructed by the managing physician). If you are taking a blood thinner ( Eliquis, Coumadin, Xarelto, Lovenox, Asprin, Heparin, etc.) please have the provider that manages this instruct you on when to stop taking prior to surgery.     · If you are feeling sick, have a fever or cough and have seen your PCP let our office know 48 hours prior to surgery. It may be subject to rescheduling.       Eating and drinking restrictions              Follow instructions from your health care provider about eating and drinking, which may include:    · The day before the procedure - stop eating heavy meals or foods such as meat,  fried foods, or fatty foods.    · Eat a light meals and drink plenty of fluids    ·  NO MILK, CREAM, OR ORANGE JUICE.  You may have sips of clear fluids up until two-three hours prior to your arrival to the hospital on the morning of surgery      What happens after the procedure?  · You will be given pain medicine as needed.  · Your blood pressure, heart rate, breathing rate, and blood oxygen level will be monitored until the medicines you were given have worn off.  · You will need to stay in the hospital to recover for one to two days.  · You may have a liquid diet at first. You will most likely return to your usual diet the day after surgery.  · You will still have the urinary catheter in place. It will likely be removed the day after surgery.  · You may have to wear compression stockings. These stockings help to prevent blood clots and reduce swelling in your legs.  · You will be encouraged to walk as soon as possible. You will also use a device or do breathing exercises to keep your lungs clear.  · You may need to use a maxi-pad for vaginal discharge/bleeding.      Post Surgical Care Instructions.     • You may be discharged from surgery with an abdominal binder, this is given to you for your comfort only. You do not need to wear it unless you feel that it helps with discomfort after your surgery.   • You will have a large abdominal incision, this will sometimes have glue or staples. If you have staples a one week appointment will be made to have them removed during a nurse visit. Sutures will dissolve on their own and glue will wear off over time. Please do not pick the glue.   • Keep incisions clean and dry. Do not use antibacterial washes or ointments on your incisions.   • Small amount of clear or pink tinged drainage from incisions is normal.  • You may have light vaginal bleeding and spotting up to 6 weeks after surgery.  • You will need to continue a bowel regimen after surgery to prevent constipation or  bowel obstruction. Take your stool softener as prescribed. If you do not produce a bowel movent in 24 hours after surgery take a laxative ( milk of magnesia or miralax). Narcotics cause constipation, please take them as directed, try alternating ibuprofen and tylenol before taking the narcotic ( oxycodone, hydrocodone, etc.).

## 2020-09-28 NOTE — PROGRESS NOTES
New Patient Office Visit      Patient Name: Ela Torres  : 1970   MRN: [id number]     Referring Physician: None - referred from ED    Chief Complaint:  New patient - pelvic mass    History of Present Illness: Ela Torres is a 50 y.o. female who is here today to establish care with Oncology.  She was seen in the ED on Saturday due to acute onset of right flank pain and hematuria.  Pain was initially a 10 out of 10.  Patient states he was initially concerned with a kidney stone given her hematuria on her UA.  However work-up including CT abdomen pelvis demonstrated a 16 cm right simple appearing cyst.  She states since discharge her pain has completely resolved.  She denies any additional problems with her bladder.  No bowel issues per her report.  Denies any history of abnormal vaginal bleeding.  She has not noticed any difficulties tolerating regular diet.  Does have a longstanding history of early satiety but this is not unusual for her.    Oncologic History:  Oncology/Hematology History   Pelvic mass   2020 Initial Diagnosis    ED referral    2020: Presented to ED with pain.  CT A/P with 16 cm right simple appearing cyst arising from the right adnexa.  No adenopathy or free fluid.          Past Medical History:   Past Medical History:   Diagnosis Date   • Hypertension    • Pelvic mass        Past Surgical History:   Past Surgical History:   Procedure Laterality Date   • MOUTH SURGERY         Family History:   Family History   Problem Relation Age of Onset   • Stroke Father    • Lung cancer Father    • Sarcoidosis Father    • Breast cancer Sister    • Polycythemia Sister    • Ovarian cancer Neg Hx    • Uterine cancer Neg Hx    • Colon cancer Neg Hx    • Melanoma Neg Hx    • Prostate cancer Neg Hx        Social History:   Social History     Socioeconomic History   • Marital status: Single     Spouse name: Not on file   • Number of children: Not on file   • Years of education:  Not on file   • Highest education level: Not on file   Tobacco Use   • Smoking status: Light Tobacco Smoker     Packs/day: 0.30     Types: Cigarettes   Substance and Sexual Activity   • Alcohol use: Yes     Alcohol/week: 3.0 standard drinks     Types: 3 Glasses of wine per week   • Drug use: No   • Sexual activity: Not Currently       Past OB/GYN History:   OB History   No obstetric history on file.   Never been pregnant  Denies any history of abnormal Pap test.  Last Pap test 3 years ago.  Denies any history of ovarian cysts.    Denies any GYN problems    Medications:     Current Outpatient Medications:   •  losartan-hydrochlorothiazide (HYZAAR) 100-12.5 MG per tablet, losartan 100 mg-hydrochlorothiazide 12.5 mg tablet  TAKE ONE TABLET BY MOUTH DAILY - needs appointment for refills, Disp: , Rfl:   •  Triamcinolone Acetonide (NASACORT ALLERGY 24HR NA), 1 spray into each nostril Daily., Disp: , Rfl:     Current Facility-Administered Medications:   •  acetaminophen (TYLENOL) tablet 975 mg, 975 mg, Oral, Once, Penny Santoro MD  •  celecoxib (CeleBREX) capsule 200 mg, 200 mg, Oral, Once, Penny Santoro MD  •  gabapentin (NEURONTIN) capsule 300 mg, 300 mg, Oral, Once, Penny Santoro MD  •  heparin (porcine) 5000 UNIT/ML injection 5,000 Units, 5,000 Units, Subcutaneous, Once, Penny Santoro MD  •  oxyCODONE (ROXICODONE) immediate release tablet 5 mg, 5 mg, Oral, Once, Penny Santoro MD    Allergies:   Allergies   Allergen Reactions   • Amoxicillin Other (See Comments)     ABD PAIN   • Codeine Other (See Comments)     CODEINE     • Erythromycin Other (See Comments)     ABD PAIN/SICK FEELING       Review of Systems:   Review of Systems   Constitutional: Negative for appetite change, chills, fatigue, fever and unexpected weight change.   HENT: Negative for congestion, hearing loss, sneezing, sore throat and tinnitus.    Eyes: Negative for visual disturbance.   Respiratory: Negative for cough, shortness of  "breath and wheezing.    Cardiovascular: Negative for chest pain, palpitations and leg swelling.   Gastrointestinal: Negative for abdominal distention, abdominal pain, constipation, diarrhea, nausea and vomiting.   Genitourinary: Negative for dyspareunia, dysuria, frequency, hematuria, pelvic pain, urgency and vaginal bleeding.   Musculoskeletal: Negative for arthralgias, back pain and myalgias.   Skin: Negative for color change, pallor and rash.        Indentation in right thigh after trauma from horse kick   Neurological: Negative for dizziness, light-headedness, numbness and headaches.   Hematological: Negative for adenopathy. Does not bruise/bleed easily.   Psychiatric/Behavioral: Negative for dysphoric mood. The patient is nervous/anxious.         Objective     Physical Exam:  Vital Signs:   Vitals:    09/28/20 1503   BP: (!) 184/86   Pulse: 78   Resp: 17   Temp: 98 °F (36.7 °C)   TempSrc: Temporal   Weight: 88.8 kg (195 lb 12.8 oz)   Height: 177.8 cm (70\")   PainSc: 0-No pain     BMI: Body mass index is 28.09 kg/m².   ECOG PS: 0  PHQ-2 Depression Screening  Little interest or pleasure in doing things? 0   Feeling down, depressed, or hopeless? 0   PHQ-2 Total Score 0     Physical Exam  Vitals signs and nursing note reviewed. Exam conducted with a chaperone present.   Constitutional:       General: She is not in acute distress.     Appearance: Normal appearance. She is well-developed and normal weight. She is not diaphoretic.   HENT:      Head: Normocephalic and atraumatic.      Right Ear: External ear normal.      Left Ear: External ear normal.      Nose: Nose normal.   Eyes:      General: No scleral icterus.        Right eye: No discharge.         Left eye: No discharge.      Conjunctiva/sclera: Conjunctivae normal.   Neck:      Musculoskeletal: Normal range of motion and neck supple.      Thyroid: No thyromegaly.   Cardiovascular:      Rate and Rhythm: Normal rate and regular rhythm.      Heart sounds: No " murmur.   Pulmonary:      Effort: Pulmonary effort is normal. No respiratory distress.      Breath sounds: Normal breath sounds. No wheezing.   Abdominal:      General: Bowel sounds are normal. There is distension.      Palpations: Abdomen is soft. There is mass (Large mobile mass feeling central abdomen arising approximately 5 cm above the umbilicus).      Tenderness: There is no abdominal tenderness. There is no guarding.      Hernia: No hernia is present.   Genitourinary:     Comments: External genitalia normal. Vagina without discharge. Cervix normal.  Difficult to palpate uterus due to large, mobile, smooth pelvico-abdominal mass. Rectovaginal exam deferred.  Musculoskeletal:         General: Deformity (Indentation along the lateral aspect of the right thigh) present. No tenderness.      Right lower leg: No edema.      Left lower leg: No edema.   Lymphadenopathy:      Cervical: No cervical adenopathy.   Skin:     General: Skin is warm and dry.      Coloration: Skin is not jaundiced.      Findings: No erythema, lesion or rash.   Neurological:      General: No focal deficit present.      Mental Status: She is alert and oriented to person, place, and time.   Psychiatric:         Behavior: Behavior normal.         Thought Content: Thought content normal.         Assessment / Plan    Ela Torres is a 50 y.o. female recently found to have a pelvic mass. I counseled her that the mass could be benign or malignant, but unfortunately there is no way to make a diagnosis without surgical resection.  We will plan for total abdominal hysterectomy, bilateral salpingectomy with unilateral oophorectomy with attempts for ovarian preservation of normal ovary.  We discussed that recent studies demonstrated a benefit of ovarian preservation in context of a normal ovary including benefits to bone health, heart health, cognition, and overall survival.  She understands that if the mass is found to be malignant that she would  require a comprehensive surgical staging. Surgery may include pelvic/ paraaortic lymphadenectomy, omentectomy, peritoneal biopsies, and removal of all visible tumor which may necessitate splenectomy/ bowel resection.  She understands that should a bowel resection be required there is a possibility that she may need a colostomy/ ileostomy, the majority of which are reversible.  We discussed the risks of surgery including bleeding, infection, wound breakdown, blood clots, injury to surrounding organs including bowel, urinary tract and nerves requiring additional intervention. We also discussed risk of nerve injury resulting in permament numbness, lower extremeity weakness, and the development of lymphedema with the potential for long term impact on quality of life. We also discussed the expected outcomes, and she agrees to proceed. We have also discussed the potential need for post operative additional treatment to include chemotherapy. She also understands the final pathology may vary from the intra-operative assessment upwards of 15% of that time and that an additional procedure may be required in the future.  All questions were answered.  She will be scheduled for surgery in the near future.  We will obtain preoperative tumor markers.    Hypertension: Currently taking losartan, hydrochlorothiazide per PCP.  Blood pressures to be arranged today but patient asymptomatic.  Encouraged follow-up with primary care.  Will obtain preoperative EKG getting hypertension.    Tobacco use: She is aware of the risks of tobacco use such as increased cancer risks, heart disease/medical co-morbidities, and increased risk of complications from surgery/treatment. She has been encouraged to quit and was offered resources including smoking cessation counseling, nicotine gum, lozenges, and patches, as well as pharmaceutical assistance (bupropion and/or varenicline). At this time, she wishes to consider her options and understands  additional resources are available. (4 min spent on counseling).    Pain assessment was performed today as a part of patient’s care.  For patients with pain related to surgery, gynecologic malignancy or cancer treatment, the plan is as noted in the assessment/plan.  For patients with pain not related to these issues, they are to seek any further needed care from a more appropriate provider, such as PCP.  Depression assessment was performed today as a part of patient's care. For patients with prior diagnoses of anxiety/depression and currently on medications, they were encouraged to seek any further needed care from their PCP or mental health professional. For those patients without a prior diagnosis and concern for depression, the plan is as noted in the assessment/plan.     Diagnoses and all orders for this visit:    Pelvic mass  -     Case Request; Standing  -     CBC and Differential; Future  -     Comprehensive metabolic panel; Future  -     Type and screen; Future  -     ECG 12 Lead; Future  -     Case Request  -     ; Future  -     Cancer Antigen 19-9; Future  -     CEA; Future  -     Pap IG, Rfx HPV ASCU; Future    Other orders  -     Follow anesthesia standing orders.; Future  -     Obtain informed consent  -     Provide NPO Instructions to Patient; Future  -     Chlorhexidine Skin Prep; Future  -     celecoxib (CeleBREX) capsule 200 mg  -     gabapentin (NEURONTIN) capsule 300 mg  -     heparin (porcine) 5000 UNIT/ML injection 5,000 Units  -     oxyCODONE (ROXICODONE) immediate release tablet 5 mg  -     acetaminophen (TYLENOL) tablet 975 mg         Follow Up: Surgery    OBED Santoro MD  Gynecologic Oncology     Please note that portions of this note may have been completed with a voice recognition program.

## 2020-10-02 ENCOUNTER — TELEPHONE (OUTPATIENT)
Dept: GYNECOLOGIC ONCOLOGY | Facility: CLINIC | Age: 50
End: 2020-10-02

## 2020-10-02 NOTE — TELEPHONE ENCOUNTER
Patient sent extensive [employer] message with multiple questions prior to surgery.  I decided to return patient's call rather than reply via [employer] given the multiple questions.  Below are her questions with my answers    Questions:    *Hysterectomy: 1) can you take uterus & not cervix?  Pros/cons?  2) Aside from ease of any future surgeries & eliminating risk, is there a benefit? 3) Is there less downtown if I don't take those out?     We discussed that there is likely not benefit a supracervical hysterectomy and cervical preservation in the setting of hysterectomy.  We also discussed that the risk is part of a hysterectomy is typically the dividing and ligating of the uterine vessels which has to be done regardless whether or not the cervix is preserved.  The only benefit of doing a supracervical hysterectomy would be that there is no vaginal incision.  Also discussed that the short-term benefit of hysterectomy is probably minimal.  The true benefit is from hopefully preventing reoperation as well as limiting risk of uterine precancer/cancer or cervical precancer/cancer.  Finally we also discussed that her downtime is can be driven by her vertical midline incision rather than whether or not she has a hysterectomy.     *How soon can I drive if not on narcotics?  Can I wear a seatbelt?   She can drive if not on narcotics as long as she is comfortable enough to sleep on the gas.  I recommend always wearing a seatbelt even upon day of discharge.      *Can I sleep on my side?  I normally sit with one foot under me, or legs curled/crossed to the side, is that allowed?     Okay to sleep on side and sit however is comfortable.     * How soon can I begin lite gym work? If cardio only - treadmill?/incline/speed? Same questions for bike please.     She can slowly start incorporating gym work back into her regular life as her body tolerates.  I am not able to give her parameters in terms of incline and speed.  I recommend the  "patient allow her body to be her guide.  She starts having pain discomfort or changes in her incision I would recommend backing off on exercise.      *How soon can I start taking my vitamins and whole food supplements?     Okay to start vitamins and healthy supplements upon discharge.       *What about laser therapy to promote healing?  Or any \"alternative/non-traditional\" therapies?   She should not require laser therapy to promote healing.  I have no indication that she would not feel well from surgery.  Am unaware of any alternative/nontraditional therapies that improve healing at this time.       *Incision... why is it perpendicular rather than horizontal?   A vertical midline incision is required compared to a Pfannenstiel due to the size of the mass as well as to facilitate staging surgery in the event that a cancer is identified.    Part 2     *Pre-op:  Plasma collection... a friend that had a hysterectomy said they collected plasma to use at the incision site to help with post-surgical healing.  Is that a thing? 2) Do I need to donate blood to myself if the plasma thing isn't a thing or separately?   Studies of use of platelet rich plasma and the benefits and incision healing are mixed.  Most studies do not show benefit in terms of cosmesis and or reduction wound complications with PRP.  Furthermore I do not believe that [facility] has the ability to process and store PRP for conventional surgery.  She also does not need to donate blood to herself.          Please know that staying still and not going outside is an incredible struggle for me. So the faster we get me outta the hospital, the better for everyone.   Agree with discharge as early as possible as long as patient meeting postoperative milestones and is safe.        Also some concern I may have a uti starting but I'm not sure.  Do I need to be on an abx to prevent it from postponing surgery?   Patient is noticed some burning in her upper abdomen " intermittent.  She denies any true dysuria, hematuria, urgency, or fever.  This point I recommend avoiding antibiotics unless absolutely necessary.  We will give her dose of antibiotics at the time of her surgery that should cover for any sort of UTI.    Patient is aware that she can call with any additional questions.

## 2020-10-02 NOTE — TELEPHONE ENCOUNTER
Fracisco with [facility] is calling to confirm if surgery for 10.6.20 is in or outpatient surgery. She needs to confirm in order authorize procedure.    Phone #: [phone]

## 2020-10-05 ENCOUNTER — APPOINTMENT (OUTPATIENT)
Dept: PREADMISSION TESTING | Facility: HOSPITAL | Age: 50
End: 2020-10-05

## 2020-10-05 ENCOUNTER — ANESTHESIA EVENT (OUTPATIENT)
Dept: PERIOP | Facility: HOSPITAL | Age: 50
End: 2020-10-05

## 2020-10-05 VITALS — HEIGHT: 70 IN | WEIGHT: 197.53 LBS | BODY MASS INDEX: 28.28 KG/M2

## 2020-10-05 DIAGNOSIS — R19.00 PELVIC MASS: ICD-10-CM

## 2020-10-05 LAB
ABO GROUP BLD: NORMAL
ALBUMIN SERPL-MCNC: 4.5 G/DL (ref 3.5–5.2)
ALBUMIN/GLOB SERPL: 2 G/DL
ALP SERPL-CCNC: 82 U/L (ref 39–117)
ALT SERPL W P-5'-P-CCNC: 18 U/L (ref 1–33)
ANION GAP SERPL CALCULATED.3IONS-SCNC: 11 MMOL/L (ref 5–15)
AST SERPL-CCNC: 15 U/L (ref 1–32)
BASOPHILS # BLD AUTO: 0.07 10*3/MM3 (ref 0–0.2)
BASOPHILS NFR BLD AUTO: 0.9 % (ref 0–1.5)
BILIRUB SERPL-MCNC: 0.3 MG/DL (ref 0–1.2)
BLD GP AB SCN SERPL QL: NEGATIVE
BUN SERPL-MCNC: 19 MG/DL (ref 6–20)
BUN/CREAT SERPL: 26.4 (ref 7–25)
CALCIUM SPEC-SCNC: 9.5 MG/DL (ref 8.6–10.5)
CANCER AG125 SERPL QL: 15.8 U/ML (ref 0–38.1)
CANCER AG19-9 SERPL-ACNC: 9 U/ML
CEA SERPL-MCNC: 4.2 NG/ML
CHLORIDE SERPL-SCNC: 103 MMOL/L (ref 98–107)
CO2 SERPL-SCNC: 23 MMOL/L (ref 22–29)
CREAT SERPL-MCNC: 0.72 MG/DL (ref 0.57–1)
DEPRECATED RDW RBC AUTO: 41.8 FL (ref 37–54)
EOSINOPHIL # BLD AUTO: 0.41 10*3/MM3 (ref 0–0.4)
EOSINOPHIL NFR BLD AUTO: 5.2 % (ref 0.3–6.2)
ERYTHROCYTE [DISTWIDTH] IN BLOOD BY AUTOMATED COUNT: 12.1 % (ref 12.3–15.4)
GFR SERPL CREATININE-BSD FRML MDRD: 85 ML/MIN/1.73
GLOBULIN UR ELPH-MCNC: 2.2 GM/DL
GLUCOSE SERPL-MCNC: 110 MG/DL (ref 65–99)
HCT VFR BLD AUTO: 43.3 % (ref 34–46.6)
HGB BLD-MCNC: 14.2 G/DL (ref 12–15.9)
IMM GRANULOCYTES # BLD AUTO: 0.03 10*3/MM3 (ref 0–0.05)
IMM GRANULOCYTES NFR BLD AUTO: 0.4 % (ref 0–0.5)
LYMPHOCYTES # BLD AUTO: 1.96 10*3/MM3 (ref 0.7–3.1)
LYMPHOCYTES NFR BLD AUTO: 25 % (ref 19.6–45.3)
MCH RBC QN AUTO: 30.5 PG (ref 26.6–33)
MCHC RBC AUTO-ENTMCNC: 32.8 G/DL (ref 31.5–35.7)
MCV RBC AUTO: 93.1 FL (ref 79–97)
MONOCYTES # BLD AUTO: 0.73 10*3/MM3 (ref 0.1–0.9)
MONOCYTES NFR BLD AUTO: 9.3 % (ref 5–12)
NEUTROPHILS NFR BLD AUTO: 4.65 10*3/MM3 (ref 1.7–7)
NEUTROPHILS NFR BLD AUTO: 59.2 % (ref 42.7–76)
NRBC BLD AUTO-RTO: 0 /100 WBC (ref 0–0.2)
PLATELET # BLD AUTO: 237 10*3/MM3 (ref 140–450)
PMV BLD AUTO: 11.3 FL (ref 6–12)
POTASSIUM SERPL-SCNC: 4.1 MMOL/L (ref 3.5–5.2)
PROT SERPL-MCNC: 6.7 G/DL (ref 6–8.5)
RBC # BLD AUTO: 4.65 10*6/MM3 (ref 3.77–5.28)
RH BLD: NEGATIVE
SARS-COV-2 RNA RESP QL NAA+PROBE: NOT DETECTED
SODIUM SERPL-SCNC: 137 MMOL/L (ref 136–145)
T&S EXPIRATION DATE: NORMAL
WBC # BLD AUTO: 7.85 10*3/MM3 (ref 3.4–10.8)

## 2020-10-05 PROCEDURE — 86850 RBC ANTIBODY SCREEN: CPT | Performed by: OBSTETRICS & GYNECOLOGY

## 2020-10-05 PROCEDURE — 87635 SARS-COV-2 COVID-19 AMP PRB: CPT | Performed by: OBSTETRICS & GYNECOLOGY

## 2020-10-05 PROCEDURE — 93005 ELECTROCARDIOGRAM TRACING: CPT

## 2020-10-05 PROCEDURE — 86301 IMMUNOASSAY TUMOR CA 19-9: CPT | Performed by: OBSTETRICS & GYNECOLOGY

## 2020-10-05 PROCEDURE — C9803 HOPD COVID-19 SPEC COLLECT: HCPCS

## 2020-10-05 PROCEDURE — 82378 CARCINOEMBRYONIC ANTIGEN: CPT | Performed by: OBSTETRICS & GYNECOLOGY

## 2020-10-05 PROCEDURE — 93010 ELECTROCARDIOGRAM REPORT: CPT | Performed by: INTERNAL MEDICINE

## 2020-10-05 PROCEDURE — 80053 COMPREHEN METABOLIC PANEL: CPT | Performed by: OBSTETRICS & GYNECOLOGY

## 2020-10-05 PROCEDURE — 86900 BLOOD TYPING SEROLOGIC ABO: CPT | Performed by: OBSTETRICS & GYNECOLOGY

## 2020-10-05 PROCEDURE — 86901 BLOOD TYPING SEROLOGIC RH(D): CPT | Performed by: OBSTETRICS & GYNECOLOGY

## 2020-10-05 PROCEDURE — 86304 IMMUNOASSAY TUMOR CA 125: CPT | Performed by: OBSTETRICS & GYNECOLOGY

## 2020-10-05 PROCEDURE — 85025 COMPLETE CBC W/AUTO DIFF WBC: CPT | Performed by: OBSTETRICS & GYNECOLOGY

## 2020-10-05 PROCEDURE — 36415 COLL VENOUS BLD VENIPUNCTURE: CPT

## 2020-10-05 RX ORDER — FAMOTIDINE 10 MG/ML
20 INJECTION, SOLUTION INTRAVENOUS ONCE
Status: CANCELLED | OUTPATIENT
Start: 2020-10-05 | End: 2020-10-05

## 2020-10-06 ENCOUNTER — ANESTHESIA (OUTPATIENT)
Dept: PERIOP | Facility: HOSPITAL | Age: 50
End: 2020-10-06

## 2020-10-06 ENCOUNTER — HOSPITAL ENCOUNTER (INPATIENT)
Facility: HOSPITAL | Age: 50
LOS: 1 days | Discharge: HOME | End: 2020-10-07
Attending: OBSTETRICS & GYNECOLOGY | Admitting: OBSTETRICS & GYNECOLOGY

## 2020-10-06 DIAGNOSIS — R19.00 PELVIC MASS: ICD-10-CM

## 2020-10-06 LAB
ABO GROUP BLD: NORMAL
B-HCG UR QL: NEGATIVE
INTERNAL NEGATIVE CONTROL: NEGATIVE
INTERNAL POSITIVE CONTROL: POSITIVE
Lab: NORMAL
RH BLD: NEGATIVE

## 2020-10-06 PROCEDURE — 25010000002 NEOSTIGMINE 10 MG/10ML SOLUTION: Performed by: NURSE ANESTHETIST, CERTIFIED REGISTERED

## 2020-10-06 PROCEDURE — 58661 LAPAROSCOPY REMOVE ADNEXA: CPT | Performed by: OBSTETRICS & GYNECOLOGY

## 2020-10-06 PROCEDURE — 0UT70ZZ RESECTION OF BILATERAL FALLOPIAN TUBES, OPEN APPROACH: ICD-10-PCS | Performed by: OBSTETRICS & GYNECOLOGY

## 2020-10-06 PROCEDURE — 25010000002 DEXAMETHASONE PER 1 MG: Performed by: NURSE ANESTHETIST, CERTIFIED REGISTERED

## 2020-10-06 PROCEDURE — 88305 TISSUE EXAM BY PATHOLOGIST: CPT | Performed by: OBSTETRICS & GYNECOLOGY

## 2020-10-06 PROCEDURE — 81025 URINE PREGNANCY TEST: CPT | Performed by: ANESTHESIOLOGY

## 2020-10-06 PROCEDURE — 25010000002 FENTANYL CITRATE (PF) 100 MCG/2ML SOLUTION: Performed by: NURSE ANESTHETIST, CERTIFIED REGISTERED

## 2020-10-06 PROCEDURE — 0UT00ZZ RESECTION OF RIGHT OVARY, OPEN APPROACH: ICD-10-PCS | Performed by: OBSTETRICS & GYNECOLOGY

## 2020-10-06 PROCEDURE — 88112 CYTOPATH CELL ENHANCE TECH: CPT | Performed by: OBSTETRICS & GYNECOLOGY

## 2020-10-06 PROCEDURE — 25010000002 ONDANSETRON PER 1 MG: Performed by: NURSE ANESTHETIST, CERTIFIED REGISTERED

## 2020-10-06 PROCEDURE — 25010000002 MIDAZOLAM PER 1 MG: Performed by: NURSE ANESTHETIST, CERTIFIED REGISTERED

## 2020-10-06 PROCEDURE — 0DTJ0ZZ RESECTION OF APPENDIX, OPEN APPROACH: ICD-10-PCS | Performed by: OBSTETRICS & GYNECOLOGY

## 2020-10-06 PROCEDURE — 88331 PATH CONSLTJ SURG 1 BLK 1SPC: CPT | Performed by: PATHOLOGY

## 2020-10-06 PROCEDURE — 88304 TISSUE EXAM BY PATHOLOGIST: CPT | Performed by: OBSTETRICS & GYNECOLOGY

## 2020-10-06 PROCEDURE — 86901 BLOOD TYPING SEROLOGIC RH(D): CPT

## 2020-10-06 PROCEDURE — 25010000002 PROPOFOL 10 MG/ML EMULSION: Performed by: NURSE ANESTHETIST, CERTIFIED REGISTERED

## 2020-10-06 PROCEDURE — 86900 BLOOD TYPING SEROLOGIC ABO: CPT

## 2020-10-06 PROCEDURE — 88307 TISSUE EXAM BY PATHOLOGIST: CPT | Performed by: OBSTETRICS & GYNECOLOGY

## 2020-10-06 PROCEDURE — 25010000002 DEXAMETHASONE SODIUM PHOSPHATE 10 MG/ML SOLUTION: Performed by: ANESTHESIOLOGY

## 2020-10-06 RX ORDER — MAGNESIUM HYDROXIDE 1200 MG/15ML
LIQUID ORAL AS NEEDED
Status: DISCONTINUED | OUTPATIENT
Start: 2020-10-06 | End: 2020-10-06 | Stop reason: HOSPADM

## 2020-10-06 RX ORDER — HYDROMORPHONE HYDROCHLORIDE 1 MG/ML
0.5 INJECTION, SOLUTION INTRAMUSCULAR; INTRAVENOUS; SUBCUTANEOUS
Status: DISCONTINUED | OUTPATIENT
Start: 2020-10-06 | End: 2020-10-06 | Stop reason: HOSPADM

## 2020-10-06 RX ORDER — MIDAZOLAM HYDROCHLORIDE 1 MG/ML
INJECTION INTRAMUSCULAR; INTRAVENOUS AS NEEDED
Status: DISCONTINUED | OUTPATIENT
Start: 2020-10-06 | End: 2020-10-06 | Stop reason: SURG

## 2020-10-06 RX ORDER — NEOSTIGMINE METHYLSULFATE 1 MG/ML
INJECTION, SOLUTION INTRAVENOUS AS NEEDED
Status: DISCONTINUED | OUTPATIENT
Start: 2020-10-06 | End: 2020-10-06 | Stop reason: SURG

## 2020-10-06 RX ORDER — SODIUM CHLORIDE 0.9 % (FLUSH) 0.9 %
3 SYRINGE (ML) INJECTION EVERY 12 HOURS SCHEDULED
Status: DISCONTINUED | OUTPATIENT
Start: 2020-10-06 | End: 2020-10-06 | Stop reason: HOSPADM

## 2020-10-06 RX ORDER — DEXAMETHASONE SODIUM PHOSPHATE 10 MG/ML
INJECTION, SOLUTION INTRAMUSCULAR; INTRAVENOUS
Status: COMPLETED | OUTPATIENT
Start: 2020-10-06 | End: 2020-10-06

## 2020-10-06 RX ORDER — ACETAMINOPHEN 325 MG/1
650 TABLET ORAL EVERY 4 HOURS
Status: DISCONTINUED | OUTPATIENT
Start: 2020-10-06 | End: 2020-10-07 | Stop reason: HOSPADM

## 2020-10-06 RX ORDER — SODIUM CHLORIDE 0.9 % (FLUSH) 0.9 %
10 SYRINGE (ML) INJECTION AS NEEDED
Status: DISCONTINUED | OUTPATIENT
Start: 2020-10-06 | End: 2020-10-06 | Stop reason: HOSPADM

## 2020-10-06 RX ORDER — FENTANYL CITRATE 50 UG/ML
50 INJECTION, SOLUTION INTRAMUSCULAR; INTRAVENOUS
Status: DISCONTINUED | OUTPATIENT
Start: 2020-10-06 | End: 2020-10-06 | Stop reason: HOSPADM

## 2020-10-06 RX ORDER — HYDROCODONE BITARTRATE AND ACETAMINOPHEN 5; 325 MG/1; MG/1
1 TABLET ORAL ONCE AS NEEDED
Status: DISCONTINUED | OUTPATIENT
Start: 2020-10-06 | End: 2020-10-06 | Stop reason: HOSPADM

## 2020-10-06 RX ORDER — HYDRALAZINE HYDROCHLORIDE 20 MG/ML
5 INJECTION INTRAMUSCULAR; INTRAVENOUS
Status: DISCONTINUED | OUTPATIENT
Start: 2020-10-06 | End: 2020-10-06 | Stop reason: HOSPADM

## 2020-10-06 RX ORDER — NALOXONE HCL 0.4 MG/ML
0.4 VIAL (ML) INJECTION AS NEEDED
Status: DISCONTINUED | OUTPATIENT
Start: 2020-10-06 | End: 2020-10-06 | Stop reason: HOSPADM

## 2020-10-06 RX ORDER — LABETALOL HYDROCHLORIDE 5 MG/ML
5 INJECTION, SOLUTION INTRAVENOUS
Status: DISCONTINUED | OUTPATIENT
Start: 2020-10-06 | End: 2020-10-06 | Stop reason: HOSPADM

## 2020-10-06 RX ORDER — PROMETHAZINE HYDROCHLORIDE 12.5 MG/1
12.5 SUPPOSITORY RECTAL EVERY 6 HOURS PRN
Status: DISCONTINUED | OUTPATIENT
Start: 2020-10-06 | End: 2020-10-07 | Stop reason: HOSPADM

## 2020-10-06 RX ORDER — ONDANSETRON 2 MG/ML
INJECTION INTRAMUSCULAR; INTRAVENOUS AS NEEDED
Status: DISCONTINUED | OUTPATIENT
Start: 2020-10-06 | End: 2020-10-06 | Stop reason: SURG

## 2020-10-06 RX ORDER — PROMETHAZINE HYDROCHLORIDE 12.5 MG/1
12.5 TABLET ORAL EVERY 6 HOURS PRN
Status: DISCONTINUED | OUTPATIENT
Start: 2020-10-06 | End: 2020-10-07 | Stop reason: HOSPADM

## 2020-10-06 RX ORDER — SODIUM CHLORIDE 0.9 % (FLUSH) 0.9 %
3-10 SYRINGE (ML) INJECTION AS NEEDED
Status: DISCONTINUED | OUTPATIENT
Start: 2020-10-06 | End: 2020-10-06 | Stop reason: HOSPADM

## 2020-10-06 RX ORDER — FAMOTIDINE 20 MG/1
20 TABLET, FILM COATED ORAL ONCE
Status: COMPLETED | OUTPATIENT
Start: 2020-10-06 | End: 2020-10-06

## 2020-10-06 RX ORDER — OXYCODONE HYDROCHLORIDE 5 MG/1
5 TABLET ORAL EVERY 4 HOURS PRN
Status: DISCONTINUED | OUTPATIENT
Start: 2020-10-06 | End: 2020-10-07 | Stop reason: HOSPADM

## 2020-10-06 RX ORDER — TEMAZEPAM 15 MG/1
15 CAPSULE ORAL NIGHTLY PRN
Status: DISCONTINUED | OUTPATIENT
Start: 2020-10-06 | End: 2020-10-07 | Stop reason: HOSPADM

## 2020-10-06 RX ORDER — OXYCODONE HYDROCHLORIDE 5 MG/1
10 TABLET ORAL EVERY 4 HOURS PRN
Status: DISCONTINUED | OUTPATIENT
Start: 2020-10-06 | End: 2020-10-07 | Stop reason: HOSPADM

## 2020-10-06 RX ORDER — SODIUM CHLORIDE, SODIUM LACTATE, POTASSIUM CHLORIDE, CALCIUM CHLORIDE 600; 310; 30; 20 MG/100ML; MG/100ML; MG/100ML; MG/100ML
100 INJECTION, SOLUTION INTRAVENOUS CONTINUOUS
Status: DISCONTINUED | OUTPATIENT
Start: 2020-10-06 | End: 2020-10-07 | Stop reason: HOSPADM

## 2020-10-06 RX ORDER — SODIUM CHLORIDE 0.9 % (FLUSH) 0.9 %
10 SYRINGE (ML) INJECTION EVERY 12 HOURS SCHEDULED
Status: DISCONTINUED | OUTPATIENT
Start: 2020-10-06 | End: 2020-10-06 | Stop reason: HOSPADM

## 2020-10-06 RX ORDER — PROPOFOL 10 MG/ML
VIAL (ML) INTRAVENOUS AS NEEDED
Status: DISCONTINUED | OUTPATIENT
Start: 2020-10-06 | End: 2020-10-06 | Stop reason: SURG

## 2020-10-06 RX ORDER — ONDANSETRON 2 MG/ML
4 INJECTION INTRAMUSCULAR; INTRAVENOUS ONCE AS NEEDED
Status: DISCONTINUED | OUTPATIENT
Start: 2020-10-06 | End: 2020-10-06 | Stop reason: HOSPADM

## 2020-10-06 RX ORDER — LIDOCAINE HYDROCHLORIDE 10 MG/ML
0.5 INJECTION, SOLUTION EPIDURAL; INFILTRATION; INTRACAUDAL; PERINEURAL ONCE AS NEEDED
Status: COMPLETED | OUTPATIENT
Start: 2020-10-06 | End: 2020-10-06

## 2020-10-06 RX ORDER — SODIUM CHLORIDE, SODIUM LACTATE, POTASSIUM CHLORIDE, CALCIUM CHLORIDE 600; 310; 30; 20 MG/100ML; MG/100ML; MG/100ML; MG/100ML
100 INJECTION, SOLUTION INTRAVENOUS CONTINUOUS
Status: DISCONTINUED | OUTPATIENT
Start: 2020-10-06 | End: 2020-10-06

## 2020-10-06 RX ORDER — IPRATROPIUM BROMIDE AND ALBUTEROL SULFATE 2.5; .5 MG/3ML; MG/3ML
3 SOLUTION RESPIRATORY (INHALATION) ONCE AS NEEDED
Status: DISCONTINUED | OUTPATIENT
Start: 2020-10-06 | End: 2020-10-06 | Stop reason: HOSPADM

## 2020-10-06 RX ORDER — LIDOCAINE HYDROCHLORIDE 10 MG/ML
INJECTION, SOLUTION EPIDURAL; INFILTRATION; INTRACAUDAL; PERINEURAL AS NEEDED
Status: DISCONTINUED | OUTPATIENT
Start: 2020-10-06 | End: 2020-10-06 | Stop reason: SURG

## 2020-10-06 RX ORDER — FENTANYL CITRATE 50 UG/ML
INJECTION, SOLUTION INTRAMUSCULAR; INTRAVENOUS AS NEEDED
Status: DISCONTINUED | OUTPATIENT
Start: 2020-10-06 | End: 2020-10-06 | Stop reason: SURG

## 2020-10-06 RX ORDER — IBUPROFEN 800 MG/1
800 TABLET ORAL EVERY 8 HOURS SCHEDULED
Status: DISCONTINUED | OUTPATIENT
Start: 2020-10-06 | End: 2020-10-07 | Stop reason: HOSPADM

## 2020-10-06 RX ORDER — DOCUSATE SODIUM 100 MG/1
100 CAPSULE, LIQUID FILLED ORAL 2 TIMES DAILY
Status: DISCONTINUED | OUTPATIENT
Start: 2020-10-06 | End: 2020-10-07 | Stop reason: HOSPADM

## 2020-10-06 RX ORDER — HEPARIN SODIUM 5000 [USP'U]/ML
5000 INJECTION, SOLUTION INTRAVENOUS; SUBCUTANEOUS EVERY 8 HOURS SCHEDULED
Status: DISCONTINUED | OUTPATIENT
Start: 2020-10-07 | End: 2020-10-07 | Stop reason: HOSPADM

## 2020-10-06 RX ORDER — SODIUM CHLORIDE, SODIUM LACTATE, POTASSIUM CHLORIDE, CALCIUM CHLORIDE 600; 310; 30; 20 MG/100ML; MG/100ML; MG/100ML; MG/100ML
9 INJECTION, SOLUTION INTRAVENOUS CONTINUOUS
Status: DISCONTINUED | OUTPATIENT
Start: 2020-10-06 | End: 2020-10-06

## 2020-10-06 RX ORDER — ROCURONIUM BROMIDE 10 MG/ML
INJECTION, SOLUTION INTRAVENOUS AS NEEDED
Status: DISCONTINUED | OUTPATIENT
Start: 2020-10-06 | End: 2020-10-06 | Stop reason: SURG

## 2020-10-06 RX ORDER — BUPIVACAINE HYDROCHLORIDE 2.5 MG/ML
INJECTION, SOLUTION EPIDURAL; INFILTRATION; INTRACAUDAL
Status: COMPLETED | OUTPATIENT
Start: 2020-10-06 | End: 2020-10-06

## 2020-10-06 RX ORDER — GLYCOPYRROLATE 0.2 MG/ML
INJECTION INTRAMUSCULAR; INTRAVENOUS AS NEEDED
Status: DISCONTINUED | OUTPATIENT
Start: 2020-10-06 | End: 2020-10-06 | Stop reason: SURG

## 2020-10-06 RX ORDER — DEXAMETHASONE SODIUM PHOSPHATE 4 MG/ML
INJECTION, SOLUTION INTRA-ARTICULAR; INTRALESIONAL; INTRAMUSCULAR; INTRAVENOUS; SOFT TISSUE AS NEEDED
Status: DISCONTINUED | OUTPATIENT
Start: 2020-10-06 | End: 2020-10-06 | Stop reason: SURG

## 2020-10-06 RX ADMIN — ROCURONIUM BROMIDE 50 MG: 10 INJECTION INTRAVENOUS at 10:59

## 2020-10-06 RX ADMIN — FENTANYL CITRATE 50 MCG: 50 INJECTION, SOLUTION INTRAMUSCULAR; INTRAVENOUS at 10:59

## 2020-10-06 RX ADMIN — IBUPROFEN 800 MG: 800 TABLET ORAL at 22:05

## 2020-10-06 RX ADMIN — LIDOCAINE HYDROCHLORIDE 50 MG: 10 INJECTION, SOLUTION EPIDURAL; INFILTRATION; INTRACAUDAL; PERINEURAL at 10:59

## 2020-10-06 RX ADMIN — EPHEDRINE SULFATE 5 MG: 50 INJECTION INTRAMUSCULAR; INTRAVENOUS; SUBCUTANEOUS at 11:44

## 2020-10-06 RX ADMIN — MIDAZOLAM 2 MG: 1 INJECTION INTRAMUSCULAR; INTRAVENOUS at 10:54

## 2020-10-06 RX ADMIN — ACETAMINOPHEN 650 MG: 325 TABLET, FILM COATED ORAL at 16:47

## 2020-10-06 RX ADMIN — ONDANSETRON 4 MG: 2 INJECTION INTRAMUSCULAR; INTRAVENOUS at 12:06

## 2020-10-06 RX ADMIN — SODIUM CHLORIDE, POTASSIUM CHLORIDE, SODIUM LACTATE AND CALCIUM CHLORIDE 100 ML/HR: 600; 310; 30; 20 INJECTION, SOLUTION INTRAVENOUS at 16:47

## 2020-10-06 RX ADMIN — FENTANYL CITRATE 50 MCG: 50 INJECTION, SOLUTION INTRAMUSCULAR; INTRAVENOUS at 11:15

## 2020-10-06 RX ADMIN — LIDOCAINE HYDROCHLORIDE 0.5 ML: 10 INJECTION, SOLUTION EPIDURAL; INFILTRATION; INTRACAUDAL; PERINEURAL at 10:24

## 2020-10-06 RX ADMIN — FAMOTIDINE 20 MG: 20 TABLET, FILM COATED ORAL at 10:24

## 2020-10-06 RX ADMIN — BUPIVACAINE HYDROCHLORIDE 60 ML: 2.5 INJECTION, SOLUTION EPIDURAL; INFILTRATION; INTRACAUDAL; PERINEURAL at 11:09

## 2020-10-06 RX ADMIN — DOCUSATE SODIUM 100 MG: 100 CAPSULE ORAL at 20:18

## 2020-10-06 RX ADMIN — FENTANYL CITRATE 50 MCG: 0.05 INJECTION, SOLUTION INTRAMUSCULAR; INTRAVENOUS at 12:36

## 2020-10-06 RX ADMIN — GLYCOPYRROLATE 0.6 MG: 0.2 INJECTION INTRAMUSCULAR; INTRAVENOUS at 12:06

## 2020-10-06 RX ADMIN — FENTANYL CITRATE 50 MCG: 0.05 INJECTION, SOLUTION INTRAMUSCULAR; INTRAVENOUS at 12:46

## 2020-10-06 RX ADMIN — DEXAMETHASONE SODIUM PHOSPHATE 8 MG: 4 INJECTION, SOLUTION INTRAMUSCULAR; INTRAVENOUS at 11:11

## 2020-10-06 RX ADMIN — SODIUM CHLORIDE, POTASSIUM CHLORIDE, SODIUM LACTATE AND CALCIUM CHLORIDE 100 ML/HR: 600; 310; 30; 20 INJECTION, SOLUTION INTRAVENOUS at 13:13

## 2020-10-06 RX ADMIN — SODIUM CHLORIDE, POTASSIUM CHLORIDE, SODIUM LACTATE AND CALCIUM CHLORIDE 9 ML/HR: 600; 310; 30; 20 INJECTION, SOLUTION INTRAVENOUS at 10:24

## 2020-10-06 RX ADMIN — NEOSTIGMINE 4 MG: 1 INJECTION INTRAVENOUS at 12:06

## 2020-10-06 RX ADMIN — DEXAMETHASONE SODIUM PHOSPHATE 4 MG: 10 INJECTION INTRAMUSCULAR; INTRAVENOUS at 11:09

## 2020-10-06 RX ADMIN — ACETAMINOPHEN 650 MG: 325 TABLET, FILM COATED ORAL at 20:18

## 2020-10-06 RX ADMIN — PROPOFOL 200 MG: 10 INJECTION, EMULSION INTRAVENOUS at 10:59

## 2020-10-06 RX ADMIN — IBUPROFEN 800 MG: 800 TABLET ORAL at 14:38

## 2020-10-06 NOTE — INTERVAL H&P NOTE
Pre-Op H&P (See Recent Office Note Attached for Full H&P)    Chief complaint:  Right sided flank pain, blood noted in he urine    HPI:      Patient is a 50 y.o. female who presents with right sided flank and abdominal pain     Review of Systems:  General ROS:  no fever, chills, rashes.  No change since last office visit.  No recent sick exposure  Cardiovascular ROS: no chest pain or dyspnea on exertion  Respiratory ROS: no cough, shortness of breath, or wheezing    Immunization History:   Influenza:  no  Pneumococcal:  no  Tetanus:  no    Meds:    No current facility-administered medications on file prior to encounter.      Current Outpatient Medications on File Prior to Encounter   Medication Sig Dispense Refill   • losartan-hydrochlorothiazide (HYZAAR) 100-12.5 MG per tablet Take 1 tablet by mouth Daily.     • Triamcinolone Acetonide (NASACORT ALLERGY 24HR NA) 1 spray into each nostril Daily.         Vital Signs:  There were no vitals taken for this visit.    Physical Exam:    CV:  S1S2 regular rate and rhythm, no murmur               Resp:  Clear to auscultation; respirations regular, even and unlabored    Results Review:     Lab Results   Component Value Date    WBC 7.85 10/05/2020    HGB 14.2 10/05/2020    HCT 43.3 10/05/2020    MCV 93.1 10/05/2020     10/05/2020    NEUTROABS 4.65 10/05/2020    GLUCOSE 110 (H) 10/05/2020    BUN 19 10/05/2020    CREATININE 0.72 10/05/2020    EGFRIFNONA 85 10/05/2020     10/05/2020    K 4.1 10/05/2020     10/05/2020    CO2 23.0 10/05/2020    CALCIUM 9.5 10/05/2020    ALBUMIN 4.50 10/05/2020    AST 15 10/05/2020    ALT 18 10/05/2020    BILITOT 0.3 10/05/2020        I reviewed the patient's new clinical results. covid test - neg    Cancer Staging (if applicable)  Cancer Patient: __ yes __no __unknown; If yes, clinical stage T:__ N:__M:__, stage group or __N/A    Assessment/Plan: 50 year old female presenting with pelvic mass.  Will undergo an exploratory  laparotomy, right sided cyst removal with right sided oophorectomy and salpingectomy      RIO Garcia  10/6/2020   10:13 EDT

## 2020-10-06 NOTE — ANESTHESIA PROCEDURE NOTES
Peripheral Block      Patient reassessed immediately prior to procedure    Patient location during procedure: OR  Stop time: 10/6/2020 11:11 AM  Reason for block: at surgeon's request and post-op pain management  Performed by  Anesthesiologist: Ben Fuentes MD  CRNA: Gianfranco Betancourt CRNA  Preanesthetic Checklist  Completed: patient identified, site marked, surgical consent, pre-op evaluation, timeout performed, IV checked, risks and benefits discussed and monitors and equipment checked  Prep:  Pt Position: supine  Sterile barriers:cap, gloves, sterile barriers and mask  Prep: ChloraPrep  Patient monitoring: blood pressure monitoring, continuous pulse oximetry and EKG  Procedure  Sedation:yes  Performed under: general  Guidance:ultrasound guided  Images:still images obtained, printed/placed on chart    Laterality:Bilateral  Block Type:TAP  Injection Technique:single-shot  Needle Type:short-bevel and echogenic  Needle Gauge:20 G  Resistance on Injection: none    Medications Used: dexamethasone sodium phosphate injection, 4 mg  bupivacaine PF (MARCAINE) 0.25 % injection, 60 mL  Med admintered at 10/6/2020 11:09 AM      Medications  Comment:Block Injection:  LA dose divided between Right and Left block        Post Assessment  Injection Assessment: negative aspiration for heme, incremental injection and no paresthesia on injection  Patient Tolerance:comfortable throughout block  Complications:no  Additional Notes      Under Ultrasound guidance, a BBraun 4inch 360 degree needle was advanced with Normal Saline hydro dissection of tissue.  The Internal Oblique and Transversus Abdominus muscles where visualized.  At or before the aponeurosis of Internal Oblique, local anesthetic spread was visualized in the Transversus Abdominus Plane. Injection was made incrementally with aspiration every 5 mls.  There was no  intravascular injection,  injection pressure was normal, there was no neural injection, and the procedure was  completed without difficulty.  Thank You.

## 2020-10-06 NOTE — ANESTHESIA PROCEDURE NOTES
Airway  Date/Time: 10/6/2020 11:06 AM  Airway not difficult    General Information and Staff    CRNA: Willis Moe CRNA    Indications and Patient Condition  Indications for airway management: airway protection    Preoxygenated: yes  MILS maintained throughout  Mask difficulty assessment: 1 - vent by mask    Final Airway Details  Final airway type: endotracheal airway      Successful airway: ETT  Cuffed: yes   Successful intubation technique: direct laryngoscopy  Facilitating devices/methods: intubating stylet  Endotracheal tube insertion site: oral  Blade: Alvarado  Blade size: 2  ETT size (mm): 7.0  Cormack-Lehane Classification: grade I - full view of glottis  Placement verified by: chest auscultation and capnometry   Measured from: gums  ETT/EBT to gums (cm): 22  Number of attempts at approach: 1  Assessment: lips, teeth, and gum same as pre-op

## 2020-10-06 NOTE — OP NOTE
Operative Note     Patient Name: Ela Torres  : 1970   MRN: [id number]   Date: 10/06/20  Time: 12:16 EDT    Date of Service:  10/06/20  Time of Service:  12:16 EDT    Surgical Staff: Surgeon(s) and Role:     * Penny Santoro MD - Primary     * Neha Foster MD - Assisting  Dr. Foster's assistance was required for the completion of this case as there were no available qualified assistants.   Additional Staff: None         Pre-operative diagnosis(es): Pre-Op Diagnosis Codes:     * Pelvic mass [R19.00]     Post-operative diagnosis(es): Post-Op Diagnosis Codes:     * Pelvic mass [R19.00]   Procedure(s): Procedure(s):  EXPLORATORY LAPAROTOMY, UNILATERAL OOPHERECTOMY, BILATERAL SALPINGECTOMY WITH INDICATED APPENDECTOMY     Antibiotics: None ordered on call to OR     Anesthesia: Type: General with Block  ASA:  III     Objective      Operative findings: 18 cm cystic mass arising from the right ovary. Torsion of the right infundibulopelvic ligament. Normal appearing left ovary. Normal appearing fallopian tubes, uterus and cervix. Woody retrocecal appendix with adhesions to the mesentery.    Specimens removed: ID Type Source Tests Collected by Time   A :  Body Fluid Pelvis NON-GYNECOLOGIC CYTOLOGY Penny Santoro MD 10/6/2020 1128   B :  Tissue Ovary, Left with Fallopian Tube TISSUE PATHOLOGY EXAM Penny Santoro MD 10/6/2020 1131   C :  Tissue Fallopian Tube, Left TISSUE PATHOLOGY EXAM Penny Santoro MD 10/6/2020 1147   D : RESIDUAL IP Tissue Pelvis TISSUE PATHOLOGY EXAM Penny Santoro MD 10/6/2020 1152   E :  Tissue Large Intestine, Appendix TISSUE PATHOLOGY EXAM Penny Santoro MD 10/6/2020 1153      Fluid Intake and Output: I/O this shift:  In: 500 [I.V.:500]  Out: -    Blood products used: No   Drains: [REMOVED] Urethral Catheter Non-latex;Silicone 16 Fr. (Removed)   Site Assessment Clean;Pink;Skin intact 20 1640   Collection Container Standard drainage bag 20  1640   Securement Method Securing device 09/26/20 1640   Catheter care complete Yes 09/26/20 1640      Implant Information: Nothing was implanted during the procedure   Complications: None   Intraoperative consult(s):    Condition: stable   Disposition: to PACU and then admit to medical-surgical floor       INDICATIONS: Ela Torres is a 50 y.o. year old female who presented with a large pelvic mass.  She was offered total abdominal hysterectomy with bilateral salpingectomy and unilateral oophorectomy.  All risks, benefits, indications and alternatives were discussed.  Patient initially consented to total abdominal hysterectomy however this consent was rescinded at the time of surgery.  She was amenable to ovarian cancer staging surgery if malignancy identified.  She was appropriately consented prior to the procedure.     PROCEDURE IN DETAIL:  The patient was met in the patient receiving area on the day of surgery.  The indications, risks, benefits and alternatives of the procedure were reviewed in detail. The patient was taken to the operating room where general anesthesia was obtained without difficulty.  A time-out procedure for safety was performed to confirm patient name, medical record number and procedure being performed.  Antibiotics were administered preoperatively. She was positioned in the dorsal lithotomy position.  Examination under anesthesia revealed the findings as described above.  The patient was then prepped and draped in the usual sterile fashion.  A Dacosta catheter was inserted under sterile conditions.    Attention was turned to the abdominal incision.  A midline supraumbilical skin incision was made skirting to the left of the umbilicus.  This incision was carried down to the underlying layer of fascia.  The fascia was incised in the midline and the incision extended superior and inferiorly with Bovie cautery.  The peritoneum was then entered sharply and the peritoneal incision  extended superiorly and inferiorly. There was a large pelvic mass arising from the right ovary noted.  There is also torsion of the right IP ligament.  The mass was delivered to the incision elevated and a clamp was placed across the pedicle.  The pedicle was excised the mass was handed off for frozen pathology.  The subsequent pedicle was ligated with an 0 Polysorb.    A self-retaining retractor was then placed, with attention paid to minimize the risk of lower extremity neurologic injury. The right retroperitoneal spaces were opened and the ureter identified.  This was dissected off bluntly and kept out of the surgical field. The residual infundibulopelvic ligament was ultimately skeletonized, clamped, cut and suture ligated with a tie of 0 polysorb.  Attention was turned to the left fallopian tube which was grasped with a Pearl elevated and excised using the Bovie.  This was handed off for permanent pathology.    Irrigation was performed and hemostasis was confirmed.  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  Given the mucinous histology our attention turned to the appendix.  The appendix was noted to be retrocecal with adhesions to the mesoappendix.  The appendix itself was very woody and abnormal.  Therefore given the abnormal appendix as well as the mucinous histology I elected to proceed with an appendectomy.  The appendix was grasped and dissected free of adhesions using the Bovie.  The blood supply was bovied as the mesoappendix was incised towards the cecum.  The base of the appendix was then clamped and the appendix doubly ligated using 2-0 silk, followed by transection. The area was hemostatic and the specimen was handed off for pathology evaluation. The appediceal stump was then cauterized.    Adherent gated and hemostasis was confirmed. After confirmation of hemostasis the packs and retractors were removed. Exploration of the abdominal wound was  performed and no retained sponges or objects were identified.  The fascia was closed using a #0 looped PDS suture from both corners.  The extrafascial tissues were irrigated and made hemostatic with Bovie cautery.  The skin was closed with 4-0 monocryl in a subcuticular fashion.    The incisions were noted to be hemostatic and a dressing was applied to the incision site. The patient was reversed from endotracheal anesthesia, and taken to the recovery room in stable condition. All sponge, lap and needle counts were correct X 2.

## 2020-10-06 NOTE — ANESTHESIA POSTPROCEDURE EVALUATION
Patient: Ela Torres    Procedure Summary     Date: 10/06/20 Room / Location:  [facility]    Anesthesia Start: 1059 Anesthesia Stop:     Procedure: EXPLORATORY LAPAROTOMY, UNILATERAL OOPHERECTOMY, BILATERAL SALPINGECTOMY WITH INDICATED APPENDECTOMY (N/A Abdomen) Diagnosis:       Pelvic mass      (Pelvic mass [R19.00])    Surgeon: Penny Santoro MD Provider: Ben Fuentes MD    Anesthesia Type: general with block ASA Status: 3          Anesthesia Type: general with block    Vitals  No vitals data found for the desired time range.    HR 59 SB  SpO2 99%  RR 16  /67  T 97.2F      Post Anesthesia Care and Evaluation    Patient location during evaluation: PACU  Patient participation: complete - patient participated  Level of consciousness: awake and alert  Pain score: 0  Pain management: adequate  Airway patency: patent  Anesthetic complications: No anesthetic complications  PONV Status: none  Cardiovascular status: hemodynamically stable and acceptable  Respiratory status: nonlabored ventilation, acceptable and nasal cannula  Hydration status: acceptable

## 2020-10-06 NOTE — ANESTHESIA PREPROCEDURE EVALUATION
Anesthesia Evaluation     Patient summary reviewed and Nursing notes reviewed   NPO Solid Status: > 8 hours  NPO Liquid Status: < 2 hours           Airway   Mallampati: II  TM distance: >3 FB  Neck ROM: full  No difficulty expected  Dental      Pulmonary    (+) a smoker Current, recent URI (current sinus congestion states allergies), sleep apnea (probable snores and has headaches in am),   (-) COPD, asthma, shortness of breath  Cardiovascular     ECG reviewed    (+) hypertension,   (-) past MI, dysrhythmias, angina, hyperlipidemia    ROS comment: ECG NSR     Neuro/Psych  (-) seizures, CVA  GI/Hepatic/Renal/Endo    (-) liver disease, no renal disease, diabetes, no thyroid disorder    Musculoskeletal     Abdominal    Substance History      OB/GYN          Other                      Anesthesia Plan    ASA 3     general with block   (TAPs   Propofol Infusion as part of Anti PONV tech )  intravenous induction     Anesthetic plan, all risks, benefits, and alternatives have been provided, discussed and informed consent has been obtained with: patient.    Plan discussed with CRNA.

## 2020-10-06 NOTE — PLAN OF CARE
Goal Outcome Evaluation:  Plan of Care Reviewed With: patient, family  Progress: improving  Outcome Summary: Up to bathroom and voided 300ml urine. Drinking without difficulty. Incision site without redness or drainage. Abdominal binder on. Ambulated in room this afternoon. Encouraged use of incentive spirometer.

## 2020-10-07 VITALS
BODY MASS INDEX: 28.28 KG/M2 | HEIGHT: 70 IN | OXYGEN SATURATION: 96 % | RESPIRATION RATE: 18 BRPM | SYSTOLIC BLOOD PRESSURE: 115 MMHG | DIASTOLIC BLOOD PRESSURE: 67 MMHG | HEART RATE: 69 BPM | WEIGHT: 197.53 LBS | TEMPERATURE: 98 F

## 2020-10-07 LAB
ANION GAP SERPL CALCULATED.3IONS-SCNC: 11 MMOL/L (ref 5–15)
BASOPHILS # BLD AUTO: 0.03 10*3/MM3 (ref 0–0.2)
BASOPHILS NFR BLD AUTO: 0.2 % (ref 0–1.5)
BUN SERPL-MCNC: 15 MG/DL (ref 6–20)
BUN/CREAT SERPL: 18.3 (ref 7–25)
CALCIUM SPEC-SCNC: 9.1 MG/DL (ref 8.6–10.5)
CHLORIDE SERPL-SCNC: 102 MMOL/L (ref 98–107)
CO2 SERPL-SCNC: 25 MMOL/L (ref 22–29)
CREAT SERPL-MCNC: 0.82 MG/DL (ref 0.57–1)
DEPRECATED RDW RBC AUTO: 42.4 FL (ref 37–54)
EOSINOPHIL # BLD AUTO: 0.13 10*3/MM3 (ref 0–0.4)
EOSINOPHIL NFR BLD AUTO: 0.7 % (ref 0.3–6.2)
ERYTHROCYTE [DISTWIDTH] IN BLOOD BY AUTOMATED COUNT: 12.3 % (ref 12.3–15.4)
GFR SERPL CREATININE-BSD FRML MDRD: 73 ML/MIN/1.73
GLUCOSE SERPL-MCNC: 131 MG/DL (ref 65–99)
HCT VFR BLD AUTO: 37.3 % (ref 34–46.6)
HGB BLD-MCNC: 12.2 G/DL (ref 12–15.9)
IMM GRANULOCYTES # BLD AUTO: 0.09 10*3/MM3 (ref 0–0.05)
IMM GRANULOCYTES NFR BLD AUTO: 0.5 % (ref 0–0.5)
LYMPHOCYTES # BLD AUTO: 1.39 10*3/MM3 (ref 0.7–3.1)
LYMPHOCYTES NFR BLD AUTO: 7.6 % (ref 19.6–45.3)
MCH RBC QN AUTO: 30.4 PG (ref 26.6–33)
MCHC RBC AUTO-ENTMCNC: 32.7 G/DL (ref 31.5–35.7)
MCV RBC AUTO: 93 FL (ref 79–97)
MONOCYTES # BLD AUTO: 1.28 10*3/MM3 (ref 0.1–0.9)
MONOCYTES NFR BLD AUTO: 7 % (ref 5–12)
NEUTROPHILS NFR BLD AUTO: 15.48 10*3/MM3 (ref 1.7–7)
NEUTROPHILS NFR BLD AUTO: 84 % (ref 42.7–76)
NRBC BLD AUTO-RTO: 0 /100 WBC (ref 0–0.2)
PLATELET # BLD AUTO: 211 10*3/MM3 (ref 140–450)
PMV BLD AUTO: 12.1 FL (ref 6–12)
POTASSIUM SERPL-SCNC: 4.1 MMOL/L (ref 3.5–5.2)
RBC # BLD AUTO: 4.01 10*6/MM3 (ref 3.77–5.28)
SODIUM SERPL-SCNC: 138 MMOL/L (ref 136–145)
WBC # BLD AUTO: 18.4 10*3/MM3 (ref 3.4–10.8)

## 2020-10-07 PROCEDURE — 80048 BASIC METABOLIC PNL TOTAL CA: CPT | Performed by: OBSTETRICS & GYNECOLOGY

## 2020-10-07 PROCEDURE — 85025 COMPLETE CBC W/AUTO DIFF WBC: CPT | Performed by: OBSTETRICS & GYNECOLOGY

## 2020-10-07 PROCEDURE — 25010000002 HEPARIN (PORCINE) PER 1000 UNITS: Performed by: OBSTETRICS & GYNECOLOGY

## 2020-10-07 RX ORDER — ACETAMINOPHEN 325 MG/1
650 TABLET ORAL EVERY 6 HOURS PRN
Qty: 60 TABLET | Refills: 0 | Status: SHIPPED | OUTPATIENT
Start: 2020-10-07

## 2020-10-07 RX ORDER — OXYCODONE HYDROCHLORIDE 5 MG/1
5 TABLET ORAL EVERY 4 HOURS PRN
Qty: 15 TABLET | Refills: 0 | Status: SHIPPED | OUTPATIENT
Start: 2020-10-07

## 2020-10-07 RX ORDER — IBUPROFEN 800 MG/1
800 TABLET ORAL EVERY 8 HOURS PRN
Qty: 30 TABLET | Refills: 0 | Status: SHIPPED | OUTPATIENT
Start: 2020-10-07 | End: 2020-10-13

## 2020-10-07 RX ORDER — DOCUSATE SODIUM 100 MG/1
100 CAPSULE, LIQUID FILLED ORAL DAILY
Qty: 60 CAPSULE | Refills: 0 | Status: SHIPPED | OUTPATIENT
Start: 2020-10-07

## 2020-10-07 RX ADMIN — HEPARIN SODIUM 5000 UNITS: 5000 INJECTION INTRAVENOUS; SUBCUTANEOUS at 05:54

## 2020-10-07 RX ADMIN — ACETAMINOPHEN 650 MG: 325 TABLET, FILM COATED ORAL at 04:35

## 2020-10-07 RX ADMIN — IBUPROFEN 800 MG: 800 TABLET ORAL at 13:59

## 2020-10-07 RX ADMIN — ACETAMINOPHEN 650 MG: 325 TABLET, FILM COATED ORAL at 00:08

## 2020-10-07 RX ADMIN — LOSARTAN POTASSIUM: 50 TABLET, FILM COATED ORAL at 09:21

## 2020-10-07 RX ADMIN — IBUPROFEN 800 MG: 800 TABLET ORAL at 05:54

## 2020-10-07 RX ADMIN — ACETAMINOPHEN 650 MG: 325 TABLET, FILM COATED ORAL at 12:57

## 2020-10-07 RX ADMIN — DOCUSATE SODIUM 100 MG: 100 CAPSULE ORAL at 09:21

## 2020-10-07 RX ADMIN — ACETAMINOPHEN 650 MG: 325 TABLET, FILM COATED ORAL at 09:21

## 2020-10-07 NOTE — PLAN OF CARE
Goal Outcome Evaluation:  Plan of Care Reviewed With: patient  Progress: improving  Outcome Summary: VSS. Ad krys in halls and room. Frequent IS use. Pain controlled with scheduled medications. Incision CDI, abd binder in place. Adequate uop.

## 2020-10-07 NOTE — PLAN OF CARE
Goal Outcome Evaluation:  Plan of Care Reviewed With: patient  Progress: improving  Outcome Summary: VSS, no c/o pain, ambulating frequently ad krys, incision CDI, tolerating PO diet. Patient to be discharged, all instructions reviewed.

## 2020-10-07 NOTE — PROGRESS NOTES
Gynecologic Oncology In-Patient Progress Note      Patient Name: Ela Torres  : 1970   MRN: [id number]       Subjective   Ela Torres is a 50 y.o. who is POD#1 from a exploratory laparotomy with right oophorectomy, bilateral salpingectomy, and indicated appendectomy for pelvic mass (IOFS- mucinous cystadenoma).    She is currently doing very well. Pain is well controlled with Tylenol and ibuprofen.  She has not required any narcotics. She is tolerating a regular diet. Voiding spontaneously.  Reports flatus/denies BM. She is ambulating without difficulty. Denies any CP, SOB, or lower extremity swelling.    Review of Systems:   Review of Systems   Constitutional: Negative for chills, fatigue and fever.   Respiratory: Negative for cough, shortness of breath and wheezing.    Cardiovascular: Negative for chest pain, palpitations and leg swelling.   Gastrointestinal: Negative for abdominal pain, constipation, diarrhea, nausea and vomiting.   Genitourinary: Negative for dysuria, pelvic pain, urgency, vaginal bleeding, vaginal discharge and vaginal pain.   Neurological: Negative for dizziness and light-headedness.        Objective     Physical Exam:  Vital Signs:   Vitals:    10/06/20 1905 10/07/20 0000 10/07/20 0437 10/07/20 0741   BP: 160/81 129/81 119/75 112/70   BP Location: Left arm Left arm Left arm    Patient Position: Lying Lying Lying    Pulse: 83 74 59 65   Resp: 18 18 17 16   Temp: 98.2 °F (36.8 °C) 98.1 °F (36.7 °C) 98.1 °F (36.7 °C) 98 °F (36.7 °C)   TempSrc: Axillary Oral Oral Oral   SpO2: 94% 94% 91% 92%   Weight:       Height:         BMI: Body mass index is 28.34 kg/m².   Weight:   Wt Readings from Last 3 Encounters:   10/06/20 89.6 kg (197 lb 8.5 oz)   10/05/20 89.6 kg (197 lb 8.5 oz)   20 88.8 kg (195 lb 12.8 oz)     I&O: I/O last 3 completed shifts:  In: 1140 [P.O.:240; I.V.:900]  Out: 1750 [Urine:1750]    Physical Exam  Vitals signs and nursing note  reviewed.   Constitutional:       General: She is not in acute distress.     Appearance: She is well-developed. She is not diaphoretic.   HENT:      Head: Normocephalic and atraumatic.      Right Ear: External ear normal.      Left Ear: External ear normal.      Nose: Nose normal.   Eyes:      General: No scleral icterus.        Right eye: No discharge.         Left eye: No discharge.      Conjunctiva/sclera: Conjunctivae normal.   Cardiovascular:      Rate and Rhythm: Normal rate and regular rhythm.      Heart sounds: Normal heart sounds. No murmur.   Pulmonary:      Effort: Pulmonary effort is normal. No respiratory distress.      Breath sounds: Normal breath sounds. No wheezing or rales.   Abdominal:      Palpations: Abdomen is soft.      Tenderness: There is no guarding.      Comments: Softly distended. Appropriately tender to palpation.  Normoactive bowel sounds. Incision C/D/I with skin glue in place.   Musculoskeletal:         General: No tenderness or deformity.   Skin:     General: Skin is warm and dry.   Neurological:      Mental Status: She is alert and oriented to person, place, and time.   Psychiatric:         Thought Content: Thought content normal.         Judgment: Judgment normal.         Inpatient Medications:     Current Facility-Administered Medications:   •  acetaminophen (TYLENOL) tablet 650 mg, 650 mg, Oral, Q4H, Penny Santoro MD, 650 mg at 10/07/20 0921  •  docusate sodium (COLACE) capsule 100 mg, 100 mg, Oral, BID, Penny Santoro MD, 100 mg at 10/07/20 0921  •  heparin (porcine) 5000 UNIT/ML injection 5,000 Units, 5,000 Units, Subcutaneous, Q8H, Penny Santoro MD, 5,000 Units at 10/07/20 0554  •  ibuprofen (ADVIL,MOTRIN) tablet 800 mg, 800 mg, Oral, Q8H, Penny Santoro MD, 800 mg at 10/07/20 0554  •  lactated ringers infusion, 100 mL/hr, Intravenous, Continuous, Penny Santoro MD, Last Rate: 100 mL/hr at 10/06/20 1647, 100 mL/hr at 10/06/20 1647  •  losartan (COZAAR) 100 mg,  hydroCHLOROthiazide (HYDRODIURIL) 12.5 mg for HYZAAR 100-12.5, , Oral, Daily, Penny Santoro MD  •  oxyCODONE (ROXICODONE) immediate release tablet 10 mg, 10 mg, Oral, Q4H PRN, Penny Santoro MD  •  oxyCODONE (ROXICODONE) immediate release tablet 5 mg, 5 mg, Oral, Q4H PRN, Penny Santoro MD  •  promethazine (PHENERGAN) suppository 12.5 mg, 12.5 mg, Rectal, Q6H PRN **OR** promethazine (PHENERGAN) tablet 12.5 mg, 12.5 mg, Oral, Q6H PRN, Penny Santoro MD  •  temazepam (RESTORIL) capsule 15 mg, 15 mg, Oral, Nightly PRN, Penny Santoro MD     Results:   Lab Results   Component Value Date    WBC 18.40 (H) 10/07/2020    HGB 12.2 10/07/2020    HCT 37.3 10/07/2020    MCV 93.0 10/07/2020     10/07/2020       Lab Results   Component Value Date    GLUCOSE 131 (H) 10/07/2020    CALCIUM 9.1 10/07/2020     10/07/2020    K 4.1 10/07/2020    CO2 25.0 10/07/2020     10/07/2020    BUN 15 10/07/2020    CREATININE 0.82 10/07/2020    EGFRIFNONA 73 10/07/2020    BCR 18.3 10/07/2020    ANIONGAP 11.0 10/07/2020        Assessment / Plan    This is a 50 y.o. female who is POD#1 from a exploratory laparotomy with right oophorectomy, bilateral salpingectomy, and indicated appendectomy for pelvic mass (IOFS- mucinous cystadenoma).  I reviewed intraoperative findings including frozen section rationale for appendectomy with the patient today.    1.Post operative care   - Pain: s/p TAP block in OR; ERAS medications ordered - ibuprofen, tylenol, gabapentin, and oxycodone   - FEN: regular diet ordered   - GI: bowel regimen and antiemetics ordered   - : Adequate UOP, voiding spontaneously   - Heme: preop hgb 14.2, EBL 50 cc , postop hgb ebony 12.2; ppx heparin ordered    Plan for 30 days of postoperative lovenox - no   - Gen: encourage ambulation, IS use   - Tubes/lines/drains: IV hep-locked   - Code status: Full    2. Medical co-morbidities:   -Hypertension: Home losartan ordered    3. Dispo:       - Anticipate  anticipate possible discharge home this evening if need postoperative milestones versus postop day 2.      OBED Santoro MD  Gynecologic Oncology   Date: 10/07/20  Time: 09:22 EDT    Please note that portions of this note may have been completed with a voice recognition program.

## 2020-10-07 NOTE — PROGRESS NOTES
Discharge Planning Assessment  [facility]     Patient Name: Ela Torres  MRN: [id number]  Today's Date: 10/7/2020    Admit Date: 10/6/2020    Discharge Needs Assessment     Row Name 10/07/20 1158       Living Environment    Lives With  parent(s)    Name(s) of Who Lives With Patient  Kellen Valdivia    Current Living Arrangements  home/apartment/condo    Primary Care Provided by  self    Family Caregiver if Needed  sibling(s)    Family Caregiver Names  sister is able to assist    Quality of Family Relationships  helpful;supportive    Able to Return to Prior Arrangements  yes       Resource/Environmental Concerns    Resource/Environmental Concerns  none       Transition Planning    Patient/Family Anticipates Transition to  home with family    Patient/Family Anticipated Services at Transition  none    Transportation Anticipated  family or friend will provide       Discharge Needs Assessment    Readmission Within the Last 30 Days  no previous admission in last 30 days    Equipment Currently Used at Home  none    Concerns to be Addressed  no discharge needs identified;denies needs/concerns at this time    Anticipated Changes Related to Illness  none    Equipment Needed After Discharge  none    Discharge Coordination/Progress  Pt lives in [place] with her mother.  Pt stated she was very independent prior to admission.  She did not use home health or DME and denies needing either at discharge. Pt's sister will provide transportation at discharge.        Discharge Plan     Row Name 10/07/20 1206       Plan    Plan  home    Patient/Family in Agreement with Plan  yes    Plan Comments  Pt thinks she'll be discharged home this afternoon.  Pt denies having any discharge needs.    Final Discharge Disposition Code  01 - home or self-care        Continued Care and Services - Admitted Since 10/6/2020    Coordination has not been started for this encounter.         Demographic Summary     Row Name 10/07/20 1157        General Information    Admission Type  inpatient    Arrived From  operating room    Preferred Language  English     Used During This Interaction  no    General Information Comments  PCP is Coco Francisco        Functional Status     Row Name 10/07/20 4447       Functional Status, IADL    Medications  independent    Meal Preparation  independent    Housekeeping  independent    Laundry  independent    Shopping  independent       Employment/    Employment/ Comments  Pt has insurance and prescription coverage through [employer].        Psychosocial    No documentation.       Abuse/Neglect    No documentation.       Legal    No documentation.       Substance Abuse    No documentation.       Patient Forms    No documentation.           TON Rubio

## 2020-10-08 LAB
LAB AP CASE REPORT: NORMAL
PATH REPORT.FINAL DX SPEC: NORMAL

## 2020-10-08 NOTE — DISCHARGE SUMMARY
Patient Name: Ela Torres  Admission Date: 10/6/2020   Date of Discharge:  10/7/2020  Attending Physician: Penny Santoro MD  Dictating Physician: Penny Santoro MD   Admitting Diagnosis: 1) Pelvic mass, 2) Hypertension  Discharge Diagnosis: 1) Pelvic mass, 2) Hypertension    History and Hospital Course: This is a 50 y.o. female who underwent a Procedure(s):  a exploratory laparotomy with right oophorectomy, bilateral salpingectomy, and indicated appendectomy for pelvic mass (IOFS- mucinous cystadenoma). Her procedure was uncomplicated. Her hospital problem list was as follows:    1. Post-operative: Her pain was well controlled with oral pain medications. She tolerated a regular diet without issue. She was able to void spontaneously following de la paz catheter removal.     At time of discharge, the patient was in good condition. She was tolerating good oral intake, and her pain was well controlled. She was given appropriate prescriptions prior to discharge. All questions were answered and written discharge instructions were given to the patient. She was to follow up with as listed below.      Pertinent Test Results: None  Discharge Plan:  Condition on Discharge:  Good   Diet: Regular diet   Activity at Discharge: Ad krys  Discharge Medications:      Discharge Medications      New Medications      Instructions Start Date   acetaminophen 325 MG tablet  Commonly known as: Tylenol   650 mg, Oral, Every 6 Hours PRN      docusate sodium 100 MG capsule  Commonly known as: COLACE   100 mg, Oral, Daily, Two capusles      ibuprofen 800 MG tablet  Commonly known as: ADVIL,MOTRIN   800 mg, Oral, Every 8 Hours PRN      oxyCODONE 5 MG immediate release tablet  Commonly known as: ROXICODONE   5 mg, Oral, Every 4 Hours PRN         Continue These Medications      Instructions Start Date   losartan-hydrochlorothiazide 100-12.5 MG per tablet  Commonly known as: HYZAAR   1 tablet, Oral, Daily      NASACORT ALLERGY 24HR  NA   1 spray, Nasal, Daily           Follow-up Appointments  Future Appointments   Date Time Provider Department Center   10/26/2020 11:30 AM Penny Santoro MD [facility]       Test Results Pending at Discharge:   Pending Labs     Order Current Status    Non-gynecologic Cytology In process    Tissue Pathology Exam Preliminary result          Time: Discharge 15 min

## 2020-10-09 LAB
CYTO UR: NORMAL
LAB AP CASE REPORT: NORMAL
LAB AP CLINICAL INFORMATION: NORMAL
Lab: NORMAL
PATH REPORT.FINAL DX SPEC: NORMAL
PATH REPORT.GROSS SPEC: NORMAL

## 2020-10-13 RX ORDER — IBUPROFEN 800 MG/1
TABLET ORAL
Qty: 30 TABLET | Refills: 0 | Status: SHIPPED | OUTPATIENT
Start: 2020-10-13

## 2020-10-25 PROBLEM — D27.0 MUCINOUS CYSTADENOMA OF OVARY, RIGHT: Status: ACTIVE | Noted: 2020-09-28

## 2020-10-25 NOTE — PROGRESS NOTES
"     Post Operative Office Visit      Patient Name: Ela Torres  : 1970   MRN: [id number]     Chief Complaint:    Chief Complaint   Patient presents with   • Post-op     History of Present Illness: Ela Torres is a 50 y.o. female who is status post exploratory laparotomy with unilateral oophorectomy, bilateral salpingectomy and appendectomy and is recovering well from her surgery. 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 Her post operative course has been unremarkable and continues to be recovering well. She is tolerating a normal diet. She reports normal return of bowel function. She states her pain is well controlled.     Medical, surgical, and family history updated as needed.  Subjective      Review of Systems:   Review of Systems   Constitutional: Negative for chills, fatigue and fever.   Respiratory: Negative for cough, shortness of breath and wheezing.    Cardiovascular: Negative for chest pain, palpitations and leg swelling.   Gastrointestinal: Positive for abdominal pain. Negative for constipation, diarrhea, nausea and vomiting.   Genitourinary: Negative for dysuria, pelvic pain, urgency, vaginal bleeding, vaginal discharge and vaginal pain.   Neurological: Negative for dizziness and light-headedness.   Psychiatric/Behavioral: Negative for dysphoric mood. The patient is not nervous/anxious.         Objective     Physical Exam:  Vital Signs:   Vitals:    10/26/20 1134   BP: 138/80  Comment: LUE   Pulse: 68   Resp: 18   Temp: 97.3 °F (36.3 °C)   TempSrc: Temporal   SpO2: 97%  Comment: RA   Weight: 89.4 kg (197 lb)   Height: 177.8 cm (70\")   PainSc: 0-No pain     BMI: Body mass index is 28.27 kg/m².     ECOG performance status 0    Physical Exam  Vitals signs and nursing note reviewed.   Constitutional:       General: She is not in acute distress.     Appearance: Normal appearance. She is well-developed. She is not diaphoretic.   HENT:      " Head: Normocephalic and atraumatic.      Right Ear: External ear normal.      Left Ear: External ear normal.      Nose: Nose normal.   Eyes:      General: No scleral icterus.        Right eye: No discharge.         Left eye: No discharge.      Conjunctiva/sclera: Conjunctivae normal.   Cardiovascular:      Rate and Rhythm: Normal rate and regular rhythm.      Heart sounds: Normal heart sounds. No murmur.   Pulmonary:      Effort: Pulmonary effort is normal. No respiratory distress.      Breath sounds: Normal breath sounds. No wheezing or rales.   Abdominal:      Palpations: Abdomen is soft.      Tenderness: There is no guarding.      Comments: Nondistended. Appropriately tender to palpation. Normoactive bowel sounds. Incision C/D/I with skin glue in place.   Musculoskeletal:         General: No swelling, tenderness, deformity or signs of injury.      Right lower leg: No edema.      Left lower leg: No edema.   Skin:     General: Skin is warm and dry.      Coloration: Skin is not jaundiced.      Findings: No lesion or rash.   Neurological:      General: No focal deficit present.      Mental Status: She is alert and oriented to person, place, and time. Mental status is at baseline.   Psychiatric:         Thought Content: Thought content normal.         Judgment: Judgment normal.         Medications:     Current Outpatient Medications:   •  acetaminophen (Tylenol) 325 MG tablet, Take 2 tablets by mouth Every 6 (Six) Hours As Needed for Mild Pain ., Disp: 60 tablet, Rfl: 0  •  ibuprofen (ADVIL,MOTRIN) 800 MG tablet, TAKE ONE TABLET BY MOUTH EVERY 8 HOURS AS NEEDED FOR MILD PAIN, Disp: 30 tablet, Rfl: 0  •  losartan-hydrochlorothiazide (HYZAAR) 100-12.5 MG per tablet, Take 1 tablet by mouth Daily., Disp: , Rfl:   •  Triamcinolone Acetonide (NASACORT ALLERGY 24HR NA), 1 spray into each nostril Daily., Disp: , Rfl:   •  docusate sodium (COLACE) 100 MG capsule, Take 1 capsule by mouth Daily. Two capusles, Disp: 60 capsule,  Rfl: 0  •  oxyCODONE (ROXICODONE) 5 MG immediate release tablet, Take 1 tablet by mouth Every 4 (Four) Hours As Needed for Moderate Pain ., Disp: 15 tablet, Rfl: 0    Allergies:   Allergies   Allergen Reactions   • Amoxicillin Other (See Comments)     ABD PAIN   • Codeine Other (See Comments)     hyperactivity     • Erythromycin Other (See Comments)     ABD PAIN/SICK FEELING       Pathology:    1. LEFT OVARY AND FALLOPIAN TUBE:               Benign mucinous cystadenoma.               Histologically unremarkable fallopian tube.   2. LEFT FALLOPIAN TUBE:  Histologically unremarkable complete cross section of fallopian tube with patent lumen and no plical fusion or inflammation.     3. RESIDUAL IP:               Histology compatible with intramural portion of fallopian tube, lumen patent.     4. APPENDIX, INCIDENTAL APPENDECTOMY:               No histopathologic abnormality, vermiform appendix.      Operative findings again reviewed. Pathology report discussed.       Assessment / Plan    Ela Torres is s/p exploratory laparotomy with unilateral oophorectomy, bilateral salpingectomy and appendectomy and is recovering well from her surgery. 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 She should continue recommended cancer screening guidelines, assessment for osteoporosis, including the use of Vitamin D and calcium, and maintain a healthy lifestyle. She may continue her annual examinations/follow-up locally; however, we will be readily available if needed, particularly during the post-operative course.    Assessment/Plan:   Diagnoses and all orders for this visit:    1. Mucinous cystadenoma of ovary, right (Primary)         Follow Up:   ELAN Santoro MD  Gynecologic Oncology     Please note that portions of this note may have been completed with a voice recognition program.

## 2020-10-26 ENCOUNTER — OFFICE VISIT (OUTPATIENT)
Dept: GYNECOLOGIC ONCOLOGY | Facility: CLINIC | Age: 50
End: 2020-10-26

## 2020-10-26 VITALS
WEIGHT: 197 LBS | HEIGHT: 70 IN | DIASTOLIC BLOOD PRESSURE: 80 MMHG | RESPIRATION RATE: 18 BRPM | TEMPERATURE: 97.3 F | HEART RATE: 68 BPM | BODY MASS INDEX: 28.2 KG/M2 | SYSTOLIC BLOOD PRESSURE: 138 MMHG | OXYGEN SATURATION: 97 %

## 2020-10-26 DIAGNOSIS — D27.0 MUCINOUS CYSTADENOMA OF OVARY, RIGHT: Primary | ICD-10-CM

## 2020-10-26 PROCEDURE — 99024 POSTOP FOLLOW-UP VISIT: CPT | Performed by: OBSTETRICS & GYNECOLOGY

## 2021-09-15 ENCOUNTER — TELEPHONE (OUTPATIENT)
Dept: GYNECOLOGIC ONCOLOGY | Facility: CLINIC | Age: 51
End: 2021-09-15

## 2021-09-15 NOTE — TELEPHONE ENCOUNTER
Caller: Ela Torres    Relationship: Self    Best call back number: [phone]    What was the call regarding: ELA CALLED TO SEE IF DR. TROY COULD GIVE HER A RECOMMENDATION FOR A REGULAR GYNECOLOGIST.    Do you require a callback: YES

## 2021-09-15 NOTE — TELEPHONE ENCOUNTER
RN called pt and let her know that any of the providers with the [facility] office we could recommend her to.  Pt said that she wanted a direct recommendation from Dr. Santoro.  RN let her know that Dr. Santoro is out of the office today and would have to get back with her tomorrow.  Pt v/u.

## 2021-09-16 NOTE — TELEPHONE ENCOUNTER
RN called to let pt know that Dr. Santoro recommends Dr. Jeramy Murdock or Dr. Miguel Aguero.  Pt v/u.

## 2023-09-14 ENCOUNTER — TELEPHONE (OUTPATIENT)
Dept: GYNECOLOGIC ONCOLOGY | Facility: CLINIC | Age: 53
End: 2023-09-14

## 2023-09-14 NOTE — TELEPHONE ENCOUNTER
"  Caller: Ela Torres \"Gao\"    Relationship: Self    Best call back number: [phone]    What is the best time to reach you: ANY    Who are you requesting to speak with (clinical staff, provider,  specific staff member): CLINICAL     What was the call regarding: [facility] IS CALLING STATES SHE HAD SURGERY WITH DR TROY 3 YEARS AGO    SHE STATES THAT EVERY YEAR AROUND THE TIME FRAME FROM  SURGERY SHE HAS HAS A UTI    SHE CURRENTLY HAS ONE NOW AND IS TAKING MEDICATION     JORGE IS WANTING TO SPEAK TO DR TROY REGARDING IT     Is it okay if the provider responds through MyChart: NO        "

## 2024-02-12 ENCOUNTER — OFFICE VISIT (OUTPATIENT)
Dept: OBSTETRICS AND GYNECOLOGY | Facility: CLINIC | Age: 54
End: 2024-02-12
Payer: COMMERCIAL

## 2024-02-12 VITALS
DIASTOLIC BLOOD PRESSURE: 90 MMHG | BODY MASS INDEX: 32.04 KG/M2 | WEIGHT: 223.8 LBS | SYSTOLIC BLOOD PRESSURE: 140 MMHG | HEIGHT: 70 IN

## 2024-02-12 DIAGNOSIS — Z12.31 BREAST CANCER SCREENING BY MAMMOGRAM: ICD-10-CM

## 2024-02-12 DIAGNOSIS — Z01.419 WOMEN'S ANNUAL ROUTINE GYNECOLOGICAL EXAMINATION: Primary | ICD-10-CM

## 2024-02-12 DIAGNOSIS — Z12.11 COLON CANCER SCREENING: ICD-10-CM

## 2024-02-12 PROBLEM — Z80.3 FAMILY HISTORY OF BREAST CANCER: Status: ACTIVE | Noted: 2024-02-12

## 2024-02-12 PROCEDURE — 99459 PELVIC EXAMINATION: CPT | Performed by: OBSTETRICS & GYNECOLOGY

## 2024-02-12 PROCEDURE — 99386 PREV VISIT NEW AGE 40-64: CPT | Performed by: OBSTETRICS & GYNECOLOGY

## 2024-02-16 LAB — REF LAB TEST METHOD: NORMAL

## 2024-03-28 ENCOUNTER — TELEPHONE (OUTPATIENT)
Dept: OBSTETRICS AND GYNECOLOGY | Facility: CLINIC | Age: 54
End: 2024-03-28
Payer: COMMERCIAL

## 2024-03-28 DIAGNOSIS — N39.0 URINARY TRACT INFECTION WITHOUT HEMATURIA, SITE UNSPECIFIED: Primary | ICD-10-CM

## 2024-03-28 RX ORDER — NITROFURANTOIN 25; 75 MG/1; MG/1
100 CAPSULE ORAL 2 TIMES DAILY
Qty: 14 CAPSULE | Refills: 0 | Status: SHIPPED | OUTPATIENT
Start: 2024-03-28

## 2024-03-28 NOTE — TELEPHONE ENCOUNTER
Patient called having UTI symptoms would like medication called in Per Dr. Byrne can do macrobid  100mg BID x 7

## 2024-04-09 ENCOUNTER — TELEPHONE (OUTPATIENT)
Dept: NEUROLOGY | Facility: CLINIC | Age: 54
End: 2024-04-09

## 2024-04-09 ENCOUNTER — OFFICE VISIT (OUTPATIENT)
Dept: NEUROLOGY | Facility: CLINIC | Age: 54
End: 2024-04-09
Payer: COMMERCIAL

## 2024-04-09 VITALS
HEART RATE: 83 BPM | SYSTOLIC BLOOD PRESSURE: 128 MMHG | BODY MASS INDEX: 32.5 KG/M2 | HEIGHT: 70 IN | OXYGEN SATURATION: 96 % | WEIGHT: 227 LBS | DIASTOLIC BLOOD PRESSURE: 64 MMHG

## 2024-04-09 DIAGNOSIS — R42 DIZZINESS: Primary | ICD-10-CM

## 2024-04-09 DIAGNOSIS — R11.0 NAUSEA: ICD-10-CM

## 2024-04-09 PROCEDURE — 99214 OFFICE O/P EST MOD 30 MIN: CPT | Performed by: NURSE PRACTITIONER

## 2024-04-09 RX ORDER — FAMOTIDINE 40 MG/1
TABLET, FILM COATED ORAL
COMMUNITY
Start: 2024-01-09

## 2024-04-09 RX ORDER — DIVALPROEX SODIUM 125 MG/1
125 TABLET, DELAYED RELEASE ORAL
Qty: 30 TABLET | Refills: 2 | Status: SHIPPED | OUTPATIENT
Start: 2024-04-09

## 2024-04-09 NOTE — PROGRESS NOTES
"   Neuro Office Visit      Encounter Date: 2024   Patient Name: Ela Torres  : 1970   MRN: [id number]   PCP:  Silvestre Allen MD     Chief Complaint:    Chief Complaint   Patient presents with    Dizziness    Giddiness       History of Present Illness: Ela Torres is a 53 y.o. female who is here today in Neurology for dizziness.    In the end of  she awoke not feeling well and noted nausea as well as dizziness.  She felt off balance but was not.    She went to her PCP and was diagnosed with sinus infection and fluid in both ears.  She was treated with steroids and antibiotics which did not help improve the symptoms.    She was seen by an allergist who increased her allergy medications.  She feels that all of this did was make her feel worse.    The dizziness and nausea are intermittent.  She does note that it begins anytime she turns her head nods her head in conversation or is driving.  Another trigger is when she uses cell scan at the grocery store.    She usually feels a \"weird feeling\" and then severe nausea will start.    She feels like it is an inner ear problem but ENT did not corroborate.    The sensation can last all day and usually occurs a couple of times a week.    Standing helps marbella the symptoms.    Start she also becomes really hot and then feels like she gets bloated in her abdomen.    She is also been sleeping a lot more which is very unusual for her.    Previously she had migraines associated with photophobia and photophobia but started going to a chiropractor and using supplements and has not had a migraine in approximately 8 years.    She denies diplopia or blurred vision.    Additionally she has recently started using reading glasses and notes that when she wears the plastic ones that put any pressure around her ears in the corner of her face it causes her to be nauseated.    Subjective      Past Medical History:   Past Medical " History:   Diagnosis Date    Anxiety     Headache, tension-type     Not for close to 10 years tho    Hypertension     Intracranial hypertension 12/26/2023    dx with MRI-has appt with neuro in April 2024    Ovarian cyst Sep 2020    Free floating cyst in abdomen-removed via sx    Pelvic mass     Vision loss     Just normal - readers needed       Past Surgical History:   Past Surgical History:   Procedure Laterality Date    APPENDECTOMY  10/06/2020    EXPLORATORY LAPAROTOMY, TOTAL ABDOMINAL HYSTERECTOMY, SALPINGO OOPHORECTOMY WITH TUMOR DEBULKING N/A 10/06/2020    Procedure: EXPLORATORY LAPAROTOMY, UNILATERAL OOPHERECTOMY, BILATERAL SALPINGECTOMY WITH INDICATED APPENDECTOMY;  Surgeon: Penny Santoro MD;  Location: [facility];  Service: Gynecology Oncology;  Laterality: N/A;    MOUTH SURGERY      WISDOM TOOTH EXTRACTION         Family History:   Family History   Problem Relation Age of Onset    Stroke Father     Lung cancer Father     Sarcoidosis Father     Rheum arthritis Mother     Migraines Mother     Breast cancer Sister     Polycythemia Sister     Ovarian cancer Neg Hx     Uterine cancer Neg Hx     Colon cancer Neg Hx     Melanoma Neg Hx     Prostate cancer Neg Hx        Social History:   Social History     Socioeconomic History    Marital status: Single   Tobacco Use    Smoking status: Light Smoker     Current packs/day: 0.30     Types: Cigarettes    Smokeless tobacco: Never    Tobacco comments:     I dont remember the dates   Substance and Sexual Activity    Alcohol use: Yes     Alcohol/week: 1.0 standard drink of alcohol     Types: 1 Glasses of wine per week     Comment: Seldom since this started.    Drug use: No    Sexual activity: Yes     Partners: Male     Birth control/protection: Post-menopausal     Comment: Committed relationship of 5.5 years       Medications:     Current Outpatient Medications:     famotidine (PEPCID) 40 MG tablet, , Disp: , Rfl:     losartan-hydrochlorothiazide (HYZAAR) 100-12.5 MG  per tablet, Take 1 tablet by mouth Daily., Disp: , Rfl:     Triamcinolone Acetonide (NASACORT ALLERGY 24HR NA), 1 spray into each nostril Daily., Disp: , Rfl:     divalproex (Depakote) 125 MG DR tablet, Take 1 tablet by mouth every night at bedtime., Disp: 30 tablet, Rfl: 2    Allergies:   Allergies   Allergen Reactions    Amoxicillin Other (See Comments)     ABD PAIN    Cefdinir Nausea Only    Codeine Other (See Comments)     hyperactivity      Erythromycin Other (See Comments)     ABD PAIN/SICK FEELING       PHQ-9 Total Score:     STEADI Fall Risk Assessment has not been completed.    Objective     Physical Exam:   Physical Exam  Vitals reviewed.   Eyes:      Extraocular Movements: EOM normal.      Pupils: Pupils are equal, round, and reactive to light.   Neurological:      Mental Status: She is oriented to person, place, and time.      Coordination: Finger-Nose-Finger Test, Heel to Shin Test and Romberg Test normal.      Gait: Gait is intact. Tandem walk normal.      Deep Tendon Reflexes:      Reflex Scores:       Tricep reflexes are 2+ on the right side and 2+ on the left side.       Bicep reflexes are 2+ on the right side and 2+ on the left side.       Brachioradialis reflexes are 2+ on the right side and 2+ on the left side.       Patellar reflexes are 2+ on the right side and 2+ on the left side.       Achilles reflexes are 2+ on the right side and 2+ on the left side.  Psychiatric:         Speech: Speech normal.         Neurologic Exam     Mental Status   Oriented to person, place, and time.   Attention: normal. Concentration: normal.   Speech: speech is normal   Level of consciousness: alert  Knowledge: good.     Cranial Nerves     CN II   Visual fields full to confrontation.     CN III, IV, VI   Pupils are equal, round, and reactive to light.  Extraocular motions are normal.   CN III: no CN III palsy  CN VI: no CN VI palsy    CN V   Facial sensation intact.     CN VII   Facial expression full, symmetric.  "    CN VIII   CN VIII normal.     CN IX, X   CN IX normal.   CN X normal.     CN XI   CN XI normal.     CN XII   CN XII normal.     Motor Exam     Strength   Right neck flexion: 5/5  Left neck flexion: 5/5  Right neck extension: 5/5  Left neck extension: 5/5  Right deltoid: 5/5  Left deltoid: 5/5  Right biceps: 5/5  Left biceps: 5/5  Right triceps: 5/5  Left triceps: 5/5  Right wrist flexion: 5/5  Left wrist flexion: 5/5  Right wrist extension: 5/5  Left wrist extension: 5/5  Right interossei: 5/5  Left interossei: 5/5  Right abdominals: 5/5  Left abdominals: 5/5  Right iliopsoas: 5/5  Left iliopsoas: 5/5  Right quadriceps: 5/5  Left quadriceps: 5/5  Right hamstrin/5  Left hamstrin/5  Right glutei: 5/5  Left glutei: 5/5  Right anterior tibial: 5/5  Left anterior tibial: 5/5  Right posterior tibial: 5/5  Left posterior tibial: 5/5  Right peroneal: 5/5  Left peroneal: 5/5  Right gastroc: 5/5  Left gastroc: 5/5    Sensory Exam   Light touch normal.     Gait, Coordination, and Reflexes     Gait  Gait: normal    Coordination   Romberg: negative  Finger to nose coordination: normal  Heel to shin coordination: normal  Tandem walking coordination: normal    Tremor   Resting tremor: absent  Intention tremor: absent  Action tremor: absent    Reflexes   Right brachioradialis: 2+  Left brachioradialis: 2+  Right biceps: 2+  Left biceps: 2+  Right triceps: 2+  Left triceps: 2+  Right patellar: 2+  Left patellar: 2+  Right achilles: 2+  Left achilles: 2+  Right : 2+  Left : 2+       Vital Signs:   Vitals:    24 1412   BP: 128/64   Pulse: 83   SpO2: 96%   Weight: 103 kg (227 lb)   Height: 177.8 cm (70\")     Body mass index is 32.57 kg/m².     Results:   Imaging:   No Images in the past 120 days found..     Labs:    No results found for: \"CMP\", \"PROTEIN\", \"ANTIMOGAB\", \"PBJDFJ6PXQZ\", \"JCVRESULT\", \"QUANTTBGOLD\", \"CBCDIF\", \"IGGALBSER\"     Assessment / Plan      Assessment/Plan:   Diagnoses and all orders for this " visit:    1. Dizziness (Primary)  Comments:  Start Depakote    2. Nausea    Other orders  -     divalproex (Depakote) 125 MG DR tablet; Take 1 tablet by mouth every night at bedtime.  Dispense: 30 tablet; Refill: 2           Patient Education:     Reviewed medications, potential side effects and signs and symptoms to report. Discussed risk versus benefits of treatment plan with patient and/or family-including medications, labs and radiology that may be ordered. Addressed questions and concerns during visit. Patient and/or family verbalized understanding and agree with plan. Instructed to call the office with any questions and report to ER with any life-threatening symptoms.     Follow Up:   Return in about 3 months (around 7/9/2024).    I spent 45 minutes face to face with the patient. I personally spent 50 percent of this time counseling and discussing diagnosis, diagnostic testing, driving, treatment options, and management .       During this visit the following were done:  Labs Reviewed []    Labs Ordered []    Radiology Reports Reviewed [x]    Radiology Ordered []    PCP Records Reviewed []    Referring Provider Records Reviewed [x]    ER Records Reviewed []    Hospital Records Reviewed []    History Obtained From Family []    Radiology Images Reviewed []    Other Reviewed [x]    Records Requested []      ROBERT Tierney  [facility]  [address] [place] [address] [address]  [facility] [id number] [phone]

## 2024-06-27 ENCOUNTER — TELEPHONE (OUTPATIENT)
Dept: NEUROLOGY | Facility: CLINIC | Age: 54
End: 2024-06-27
Payer: COMMERCIAL

## 2024-06-27 NOTE — TELEPHONE ENCOUNTER
Provider: ADRIANNE    Caller: JORGE    Relationship to Patient: SELF    Phone Number: [phone]    Reason for Call: PATIENT WANTS A CALL BACK TO CONFIRM IF SHE SHOULD COME TO HER APPOINTMENT DUE TO NOT TAKING THE DEPAKOTE THAT THE PROVIDER PRESCRIBED FOR HER.

## (undated) DEVICE — DRAPE, LAVH, STERILE: Brand: MEDLINE

## (undated) DEVICE — ANTIBACTERIAL UNDYED BRAIDED (POLYGLACTIN 910), SYNTHETIC ABSORBABLE SUTURE: Brand: COATED VICRYL

## (undated) DEVICE — DRAPE,UNDERBUTTOCKS,STERILE: Brand: MEDLINE

## (undated) DEVICE — SKIN AFFIX SURG ADHESIVE 72/CS 0.55ML: Brand: MEDLINE

## (undated) DEVICE — SUT PDS LP 1 TP1 96IN VIO PDP880GA

## (undated) DEVICE — SUT VICYL PLS CTD ANTIB BR 1 27IN VIL

## (undated) DEVICE — LEX BASIC NO DRAPE: Brand: MEDLINE INDUSTRIES, INC.

## (undated) DEVICE — SUT VIC 12X27 D8116 BX/12

## (undated) DEVICE — TOTAL TRAY, 16FR 10ML SIL FOLEY, URN: Brand: MEDLINE

## (undated) DEVICE — SUT MONOCRYL PLS ANTIB UND 3/0  PS1 27IN

## (undated) DEVICE — CVR HNDL LIGHT RIGID

## (undated) DEVICE — 3M™ STERI-DRAPE™ INSTRUMENT POUCH 1018: Brand: STERI-DRAPE™

## (undated) DEVICE — GLV SURG DERMASSURE GRN LF PF SZ 6.5

## (undated) DEVICE — GLV SURG SENSICARE PI MIC PF SZ6 LF STRL